# Patient Record
Sex: FEMALE | Race: WHITE | HISPANIC OR LATINO
[De-identification: names, ages, dates, MRNs, and addresses within clinical notes are randomized per-mention and may not be internally consistent; named-entity substitution may affect disease eponyms.]

---

## 2017-02-23 ENCOUNTER — APPOINTMENT (OUTPATIENT)
Dept: INTERNAL MEDICINE | Facility: CLINIC | Age: 42
End: 2017-02-23

## 2017-02-23 VITALS
OXYGEN SATURATION: 98 % | HEART RATE: 83 BPM | HEIGHT: 63 IN | BODY MASS INDEX: 39.16 KG/M2 | WEIGHT: 221 LBS | DIASTOLIC BLOOD PRESSURE: 77 MMHG | SYSTOLIC BLOOD PRESSURE: 114 MMHG | TEMPERATURE: 98.8 F

## 2017-03-01 ENCOUNTER — APPOINTMENT (OUTPATIENT)
Dept: OBGYN | Facility: CLINIC | Age: 42
End: 2017-03-01

## 2017-03-01 VITALS
BODY MASS INDEX: 38.45 KG/M2 | SYSTOLIC BLOOD PRESSURE: 117 MMHG | HEIGHT: 63 IN | DIASTOLIC BLOOD PRESSURE: 79 MMHG | WEIGHT: 217 LBS | HEART RATE: 94 BPM

## 2017-03-06 ENCOUNTER — APPOINTMENT (OUTPATIENT)
Dept: OBGYN | Facility: CLINIC | Age: 42
End: 2017-03-06

## 2017-03-07 LAB
BASOPHILS # BLD AUTO: 0.03 K/UL
BASOPHILS NFR BLD AUTO: 0.3 %
CANDIDA VAG CYTO: NOT DETECTED
EOSINOPHIL # BLD AUTO: 0.19 K/UL
EOSINOPHIL NFR BLD AUTO: 2.1 %
FSH SERPL-MCNC: 4.2 IU/L
G VAGINALIS+PREV SP MTYP VAG QL MICRO: DETECTED
HCT VFR BLD CALC: 36.8 %
HGB BLD-MCNC: 11.7 G/DL
IMM GRANULOCYTES NFR BLD AUTO: 0.2 %
LYMPHOCYTES # BLD AUTO: 2.5 K/UL
LYMPHOCYTES NFR BLD AUTO: 27 %
MAN DIFF?: NORMAL
MCHC RBC-ENTMCNC: 24.3 PG
MCHC RBC-ENTMCNC: 31.8 GM/DL
MCV RBC AUTO: 76.3 FL
MONOCYTES # BLD AUTO: 1.32 K/UL
MONOCYTES NFR BLD AUTO: 14.3 %
NEUTROPHILS # BLD AUTO: 5.2 K/UL
NEUTROPHILS NFR BLD AUTO: 56.1 %
PLATELET # BLD AUTO: 405 K/UL
RBC # BLD: 4.82 M/UL
RBC # FLD: 19.9 %
T VAGINALIS VAG QL WET PREP: NOT DETECTED
T4 FREE SERPL-MCNC: 1.2 NG/DL
TSH SERPL-ACNC: 3.72 UIU/ML
WBC # FLD AUTO: 9.26 K/UL

## 2017-03-08 ENCOUNTER — OTHER (OUTPATIENT)
Age: 42
End: 2017-03-08

## 2017-03-17 ENCOUNTER — APPOINTMENT (OUTPATIENT)
Dept: OBGYN | Facility: CLINIC | Age: 42
End: 2017-03-17

## 2017-03-17 VITALS
DIASTOLIC BLOOD PRESSURE: 80 MMHG | SYSTOLIC BLOOD PRESSURE: 121 MMHG | WEIGHT: 218.6 LBS | HEIGHT: 63 IN | BODY MASS INDEX: 38.73 KG/M2 | HEART RATE: 103 BPM

## 2017-03-27 ENCOUNTER — APPOINTMENT (OUTPATIENT)
Dept: SURGERY | Facility: CLINIC | Age: 42
End: 2017-03-27

## 2017-03-27 VITALS
DIASTOLIC BLOOD PRESSURE: 78 MMHG | WEIGHT: 220 LBS | HEIGHT: 63 IN | RESPIRATION RATE: 16 BRPM | SYSTOLIC BLOOD PRESSURE: 109 MMHG | BODY MASS INDEX: 38.98 KG/M2 | HEART RATE: 102 BPM | TEMPERATURE: 98.3 F | OXYGEN SATURATION: 98 %

## 2017-03-27 DIAGNOSIS — Z87.09 PERSONAL HISTORY OF OTHER DISEASES OF THE RESPIRATORY SYSTEM: ICD-10-CM

## 2017-03-27 DIAGNOSIS — J20.8 ACUTE BRONCHITIS DUE TO OTHER SPECIFIED ORGANISMS: ICD-10-CM

## 2017-03-27 DIAGNOSIS — B96.89 ACUTE BRONCHITIS DUE TO OTHER SPECIFIED ORGANISMS: ICD-10-CM

## 2017-03-27 DIAGNOSIS — Z78.9 OTHER SPECIFIED HEALTH STATUS: ICD-10-CM

## 2017-03-27 RX ORDER — CEFUROXIME AXETIL 500 MG/1
500 TABLET ORAL
Qty: 20 | Refills: 1 | Status: DISCONTINUED | COMMUNITY
Start: 2017-02-23 | End: 2017-03-27

## 2017-03-27 RX ORDER — FLUTICASONE PROPIONATE 50 UG/1
50 SPRAY, METERED NASAL DAILY
Qty: 1 | Refills: 3 | Status: DISCONTINUED | COMMUNITY
Start: 2017-02-23 | End: 2017-03-27

## 2017-03-27 RX ORDER — METRONIDAZOLE 7.5 MG/G
0.75 GEL VAGINAL
Qty: 1 | Refills: 0 | Status: DISCONTINUED | COMMUNITY
Start: 2017-03-08 | End: 2017-03-27

## 2017-04-14 ENCOUNTER — APPOINTMENT (OUTPATIENT)
Dept: MRI IMAGING | Facility: IMAGING CENTER | Age: 42
End: 2017-04-14

## 2017-04-14 ENCOUNTER — MEDICATION RENEWAL (OUTPATIENT)
Age: 42
End: 2017-04-14

## 2017-04-14 ENCOUNTER — OUTPATIENT (OUTPATIENT)
Dept: OUTPATIENT SERVICES | Facility: HOSPITAL | Age: 42
LOS: 1 days | End: 2017-04-14
Payer: COMMERCIAL

## 2017-04-14 DIAGNOSIS — N92.6 IRREGULAR MENSTRUATION, UNSPECIFIED: ICD-10-CM

## 2017-04-14 DIAGNOSIS — Z90.49 ACQUIRED ABSENCE OF OTHER SPECIFIED PARTS OF DIGESTIVE TRACT: Chronic | ICD-10-CM

## 2017-04-14 DIAGNOSIS — N70.11 CHRONIC SALPINGITIS: ICD-10-CM

## 2017-04-14 PROCEDURE — A9585: CPT

## 2017-04-14 PROCEDURE — 72197 MRI PELVIS W/O & W/DYE: CPT

## 2017-08-08 ENCOUNTER — APPOINTMENT (OUTPATIENT)
Dept: RADIOLOGY | Facility: HOSPITAL | Age: 42
End: 2017-08-08

## 2017-08-10 ENCOUNTER — OUTPATIENT (OUTPATIENT)
Dept: OUTPATIENT SERVICES | Facility: HOSPITAL | Age: 42
LOS: 1 days | End: 2017-08-10
Payer: COMMERCIAL

## 2017-08-10 DIAGNOSIS — Z90.49 ACQUIRED ABSENCE OF OTHER SPECIFIED PARTS OF DIGESTIVE TRACT: Chronic | ICD-10-CM

## 2017-08-10 DIAGNOSIS — E66.9 OBESITY, UNSPECIFIED: ICD-10-CM

## 2017-08-10 PROCEDURE — 76700 US EXAM ABDOM COMPLETE: CPT | Mod: 26

## 2017-08-10 PROCEDURE — 76700 US EXAM ABDOM COMPLETE: CPT

## 2017-08-10 PROCEDURE — 74241: CPT

## 2017-08-10 PROCEDURE — 74241: CPT | Mod: 26

## 2017-09-26 ENCOUNTER — APPOINTMENT (OUTPATIENT)
Dept: OBGYN | Facility: CLINIC | Age: 42
End: 2017-09-26
Payer: COMMERCIAL

## 2017-09-26 VITALS
SYSTOLIC BLOOD PRESSURE: 105 MMHG | HEIGHT: 63 IN | BODY MASS INDEX: 40.08 KG/M2 | DIASTOLIC BLOOD PRESSURE: 81 MMHG | HEART RATE: 92 BPM | WEIGHT: 226.19 LBS

## 2017-09-26 PROCEDURE — 99396 PREV VISIT EST AGE 40-64: CPT

## 2017-09-26 RX ORDER — DIAZEPAM 5 MG/1
5 TABLET ORAL DAILY
Qty: 10 | Refills: 0 | Status: DISCONTINUED | COMMUNITY
Start: 2017-04-14 | End: 2017-09-26

## 2017-10-07 LAB
C TRACH RRNA SPEC QL NAA+PROBE: NORMAL
FSH SERPL-MCNC: 2.5 IU/L
N GONORRHOEA RRNA SPEC QL NAA+PROBE: NORMAL
SOURCE AMPLIFICATION: NORMAL
T4 FREE SERPL-MCNC: 0.9 NG/DL
TSH SERPL-ACNC: 2.44 UIU/ML

## 2017-10-24 ENCOUNTER — APPOINTMENT (OUTPATIENT)
Dept: INTERNAL MEDICINE | Facility: CLINIC | Age: 42
End: 2017-10-24
Payer: COMMERCIAL

## 2017-10-24 ENCOUNTER — MEDICATION RENEWAL (OUTPATIENT)
Age: 42
End: 2017-10-24

## 2017-10-24 VITALS
SYSTOLIC BLOOD PRESSURE: 110 MMHG | WEIGHT: 230 LBS | OXYGEN SATURATION: 98 % | DIASTOLIC BLOOD PRESSURE: 74 MMHG | TEMPERATURE: 98.9 F | HEIGHT: 63 IN | HEART RATE: 82 BPM | BODY MASS INDEX: 40.75 KG/M2

## 2017-10-24 LAB
BILIRUB UR QL STRIP: NEGATIVE
GLUCOSE UR-MCNC: NEGATIVE
HCG UR QL: 0.2 EU/DL
HGB UR QL STRIP.AUTO: NORMAL
KETONES UR-MCNC: NEGATIVE
LEUKOCYTE ESTERASE UR QL STRIP: NEGATIVE
NITRITE UR QL STRIP: NEGATIVE
PH UR STRIP: 6.5
PROT UR STRIP-MCNC: NEGATIVE
SP GR UR STRIP: 1.02

## 2017-10-24 PROCEDURE — 99214 OFFICE O/P EST MOD 30 MIN: CPT | Mod: 25

## 2017-10-24 PROCEDURE — 36415 COLL VENOUS BLD VENIPUNCTURE: CPT

## 2017-10-24 RX ORDER — DOXYCYCLINE 100 MG/1
100 CAPSULE ORAL TWICE DAILY
Qty: 20 | Refills: 1 | Status: DISCONTINUED | COMMUNITY
Start: 2017-10-24 | End: 2017-10-24

## 2017-10-25 LAB
ALBUMIN SERPL ELPH-MCNC: 4.4 G/DL
ALP BLD-CCNC: 105 U/L
ALT SERPL-CCNC: 15 U/L
ANION GAP SERPL CALC-SCNC: 18 MMOL/L
AST SERPL-CCNC: 12 U/L
BASOPHILS # BLD AUTO: 0.01 K/UL
BASOPHILS NFR BLD AUTO: 0.1 %
BILIRUB SERPL-MCNC: 0.2 MG/DL
BUN SERPL-MCNC: 12 MG/DL
CALCIUM SERPL-MCNC: 10 MG/DL
CHLORIDE SERPL-SCNC: 103 MMOL/L
CO2 SERPL-SCNC: 20 MMOL/L
CREAT SERPL-MCNC: 1.01 MG/DL
EOSINOPHIL # BLD AUTO: 0.2 K/UL
EOSINOPHIL NFR BLD AUTO: 2.2 %
GLUCOSE SERPL-MCNC: 78 MG/DL
HBA1C MFR BLD HPLC: 5.7 %
HCT VFR BLD CALC: 36.7 %
HGB BLD-MCNC: 11.1 G/DL
IMM GRANULOCYTES NFR BLD AUTO: 0.3 %
INR PPP: 0.86 RATIO
LYMPHOCYTES # BLD AUTO: 2.71 K/UL
LYMPHOCYTES NFR BLD AUTO: 29.4 %
MAN DIFF?: NORMAL
MCHC RBC-ENTMCNC: 23.4 PG
MCHC RBC-ENTMCNC: 30.2 GM/DL
MCV RBC AUTO: 77.3 FL
MONOCYTES # BLD AUTO: 1.13 K/UL
MONOCYTES NFR BLD AUTO: 12.3 %
NEUTROPHILS # BLD AUTO: 5.13 K/UL
NEUTROPHILS NFR BLD AUTO: 55.7 %
PLATELET # BLD AUTO: 353 K/UL
POTASSIUM SERPL-SCNC: 4.4 MMOL/L
PROT SERPL-MCNC: 7.1 G/DL
PT BLD: 9.7 SEC
RBC # BLD: 4.75 M/UL
RBC # FLD: 17.1 %
SODIUM SERPL-SCNC: 141 MMOL/L
WBC # FLD AUTO: 9.21 K/UL

## 2017-12-08 ENCOUNTER — OUTPATIENT (OUTPATIENT)
Dept: OUTPATIENT SERVICES | Facility: HOSPITAL | Age: 42
LOS: 1 days | End: 2017-12-08
Payer: COMMERCIAL

## 2017-12-08 ENCOUNTER — APPOINTMENT (OUTPATIENT)
Dept: SURGERY | Facility: CLINIC | Age: 42
End: 2017-12-08
Payer: COMMERCIAL

## 2017-12-08 VITALS
DIASTOLIC BLOOD PRESSURE: 68 MMHG | HEIGHT: 63 IN | WEIGHT: 220.02 LBS | TEMPERATURE: 98 F | SYSTOLIC BLOOD PRESSURE: 100 MMHG | HEART RATE: 81 BPM | OXYGEN SATURATION: 100 %

## 2017-12-08 DIAGNOSIS — E66.01 MORBID (SEVERE) OBESITY DUE TO EXCESS CALORIES: ICD-10-CM

## 2017-12-08 DIAGNOSIS — Z01.818 ENCOUNTER FOR OTHER PREPROCEDURAL EXAMINATION: ICD-10-CM

## 2017-12-08 DIAGNOSIS — Z98.890 OTHER SPECIFIED POSTPROCEDURAL STATES: Chronic | ICD-10-CM

## 2017-12-08 DIAGNOSIS — Z90.49 ACQUIRED ABSENCE OF OTHER SPECIFIED PARTS OF DIGESTIVE TRACT: Chronic | ICD-10-CM

## 2017-12-08 DIAGNOSIS — Z98.891 HISTORY OF UTERINE SCAR FROM PREVIOUS SURGERY: Chronic | ICD-10-CM

## 2017-12-08 LAB
ALBUMIN SERPL ELPH-MCNC: 4.2 G/DL — SIGNIFICANT CHANGE UP (ref 3.3–5)
ALP SERPL-CCNC: 87 U/L — SIGNIFICANT CHANGE UP (ref 40–120)
ALT FLD-CCNC: 21 U/L — SIGNIFICANT CHANGE UP (ref 10–45)
ANION GAP SERPL CALC-SCNC: 13 MMOL/L — SIGNIFICANT CHANGE UP (ref 5–17)
AST SERPL-CCNC: 25 U/L — SIGNIFICANT CHANGE UP (ref 10–40)
BILIRUB SERPL-MCNC: 0.3 MG/DL — SIGNIFICANT CHANGE UP (ref 0.2–1.2)
BLD GP AB SCN SERPL QL: NEGATIVE — SIGNIFICANT CHANGE UP
BUN SERPL-MCNC: 11 MG/DL — SIGNIFICANT CHANGE UP (ref 7–23)
CALCIUM SERPL-MCNC: 9.9 MG/DL — SIGNIFICANT CHANGE UP (ref 8.4–10.5)
CHLORIDE SERPL-SCNC: 101 MMOL/L — SIGNIFICANT CHANGE UP (ref 96–108)
CO2 SERPL-SCNC: 22 MMOL/L — SIGNIFICANT CHANGE UP (ref 22–31)
CREAT SERPL-MCNC: 0.87 MG/DL — SIGNIFICANT CHANGE UP (ref 0.5–1.3)
GLUCOSE SERPL-MCNC: 100 MG/DL — HIGH (ref 70–99)
HBA1C BLD-MCNC: 5.4 % — SIGNIFICANT CHANGE UP (ref 4–5.6)
HCT VFR BLD CALC: 33.6 % — LOW (ref 34.5–45)
HGB BLD-MCNC: 10.6 G/DL — LOW (ref 11.5–15.5)
MCHC RBC-ENTMCNC: 24.4 PG — LOW (ref 27–34)
MCHC RBC-ENTMCNC: 31.5 GM/DL — LOW (ref 32–36)
MCV RBC AUTO: 77.4 FL — LOW (ref 80–100)
PLATELET # BLD AUTO: 352 K/UL — SIGNIFICANT CHANGE UP (ref 150–400)
POTASSIUM SERPL-MCNC: 4.4 MMOL/L — SIGNIFICANT CHANGE UP (ref 3.5–5.3)
POTASSIUM SERPL-SCNC: 4.4 MMOL/L — SIGNIFICANT CHANGE UP (ref 3.5–5.3)
PROT SERPL-MCNC: 7.3 G/DL — SIGNIFICANT CHANGE UP (ref 6–8.3)
RBC # BLD: 4.34 M/UL — SIGNIFICANT CHANGE UP (ref 3.8–5.2)
RBC # FLD: 16.7 % — HIGH (ref 10.3–14.5)
RH IG SCN BLD-IMP: POSITIVE — SIGNIFICANT CHANGE UP
SODIUM SERPL-SCNC: 136 MMOL/L — SIGNIFICANT CHANGE UP (ref 135–145)
WBC # BLD: 4.58 K/UL — SIGNIFICANT CHANGE UP (ref 3.8–10.5)
WBC # FLD AUTO: 4.58 K/UL — SIGNIFICANT CHANGE UP (ref 3.8–10.5)

## 2017-12-08 PROCEDURE — G0463: CPT

## 2017-12-08 PROCEDURE — 80053 COMPREHEN METABOLIC PANEL: CPT

## 2017-12-08 PROCEDURE — 86900 BLOOD TYPING SEROLOGIC ABO: CPT

## 2017-12-08 PROCEDURE — 85027 COMPLETE CBC AUTOMATED: CPT

## 2017-12-08 PROCEDURE — 86901 BLOOD TYPING SEROLOGIC RH(D): CPT

## 2017-12-08 PROCEDURE — 99215 OFFICE O/P EST HI 40 MIN: CPT

## 2017-12-08 PROCEDURE — 86850 RBC ANTIBODY SCREEN: CPT

## 2017-12-08 PROCEDURE — 83036 HEMOGLOBIN GLYCOSYLATED A1C: CPT

## 2017-12-08 RX ORDER — LIDOCAINE HCL 20 MG/ML
0.2 VIAL (ML) INJECTION ONCE
Qty: 0 | Refills: 0 | Status: DISCONTINUED | OUTPATIENT
Start: 2017-12-20 | End: 2017-12-20

## 2017-12-08 RX ORDER — SODIUM CHLORIDE 9 MG/ML
3 INJECTION INTRAMUSCULAR; INTRAVENOUS; SUBCUTANEOUS EVERY 8 HOURS
Qty: 0 | Refills: 0 | Status: DISCONTINUED | OUTPATIENT
Start: 2017-12-20 | End: 2017-12-20

## 2017-12-08 RX ORDER — CEFAZOLIN SODIUM 1 G
2000 VIAL (EA) INJECTION ONCE
Qty: 0 | Refills: 0 | Status: DISCONTINUED | OUTPATIENT
Start: 2017-12-20 | End: 2017-12-21

## 2017-12-08 NOTE — H&P PST ADULT - PROBLEM SELECTOR PLAN 1
Laparoscopic Vertical Sleeve Gastrectomy   Pre- Op instructions discussed  CBC,CMP,HgA1c,Type and Screen sent  [facility] ordered the dOS Laparoscopic Vertical Sleeve Gastrectomy   Pre- Op instructions discussed  CBC,CMP,HgA1c,Type and Screen sent  [facility] ordered the DOS  Incentive spirometer instructions discussed Laparoscopic Vertical Sleeve Gastrectomy   - Pre- Op instructions discussed  - CBC,CMP,HgA1c,Type and Screen sent  - [facility] ordered the DOS  - Incentive spirometer instructions discussed  - heparin SQ ordered

## 2017-12-08 NOTE — H&P PST ADULT - RS GEN PE MLT RESP DETAILS PC
clear to auscultation bilaterally/respirations non-labored/good air movement/normal/airway patent/breath sounds equal

## 2017-12-08 NOTE — H&P PST ADULT - NSANTHOSAYNRD_GEN_A_CORE
No. ZINA screening performed.  STOP BANG Legend: 0-2 = LOW Risk; 3-4 = INTERMEDIATE Risk; 5-8 = HIGH Risk

## 2017-12-08 NOTE — H&P PST ADULT - NSCAFFEINETYPE_GEN_ALL_CORE_SD
Chief Complaint   Patient presents with   • Establish Care     consult egd      Lizz Magaña is a 61 y.o. female who presents with GERD and colon polyps.  She is here today to establish care.  Her last EGD and colonoscopy were performed by Dr. Sloan Ann up.  She did have a 5 mm polyp removed from her ascending colon, diverticulosis was also noted.  EGD was notable for a hiatal hernia and some gastric polyps which were sampled.  She reports that she aspirated and developed pneumonia post procedure.  Her heartburn is fairly well controlled with over-the-counter Nexium.  She takes this about 30 minutes prior to her evening meal.  Historically her symptoms were predominantly at night.  She eats her dinner early.  She uses Tums when necessary.  She denies any abdominal pain, nausea or dysphagia.    She has a history of chronic intermittent fecal incontinence dating back to when her children were little.  This is been an issue for over 20 years.  If her stools are loose she sometimes has trouble holding her bowels.  This has happened since she had a generous episiotomy during one of her childbirths.  She sometimes has incontinence during her course.  Normally her bowels are solid and she can hold them.  She's tried multiple regimens to bulk up her stools without sustain results.  She's interested in  surgical repair.    HPI  Past Medical History:   Diagnosis Date   • Asthma    • Diabetes mellitus    • Diverticulosis    • GERD (gastroesophageal reflux disease)      Past Surgical History:   Procedure Laterality Date   • CARPAL TUNNEL RELEASE     • COLONOSCOPY  08/15/2014    Diverticulosis, polyp, IH, EH    • COLONOSCOPY  09/03/2009   • ENDOSCOPY  08/15/2014    Small HH, non bleeding poylps   • HYSTERECTOMY         Current Outpatient Prescriptions:   •  aspirin 81 MG EC tablet, Take 81 mg by mouth Daily., Disp: , Rfl:   •  Cyanocobalamin (VITAMIN B 12 PO), Take  by mouth., Disp: , Rfl:   •  esomeprazole (nexIUM) 40 MG  capsule, Take 40 mg by mouth Every Morning Before Breakfast., Disp: , Rfl:   •  glimepiride (AMARYL) 2 MG tablet, TK 1 T PO  BID., Disp: , Rfl: 0  •  meloxicam (MOBIC) 15 MG tablet, Take 15 mg by mouth Daily., Disp: , Rfl:   •  metFORMIN ER (GLUCOPHAGE-XR) 500 MG 24 hr tablet, TK 2 TS PO BID, Disp: , Rfl: 0  •  nisoldipine (SULAR) 34 MG 24 hr tablet, Take 34 mg by mouth Daily., Disp: , Rfl:   •  vitamin D (ERGOCALCIFEROL) 64134 UNITS capsule capsule, Take 50,000 Units by mouth 1 (One) Time Per Week., Disp: , Rfl:   Allergies   Allergen Reactions   • Demerol [Meperidine]    • Eggs Or Egg-Derived Products    • Penicillins      Social History     Social History   • Marital status:      Spouse name: N/A   • Number of children: N/A   • Years of education: N/A     Occupational History   • Not on file.     Social History Main Topics   • Smoking status: Never Smoker   • Smokeless tobacco: Never Used   • Alcohol use No   • Drug use: No   • Sexual activity: Not on file     Other Topics Concern   • Not on file     Social History Narrative   • No narrative on file     Family History   Problem Relation Age of Onset   • Stomach cancer Mother      Review of Systems-no weight loss, occasional blood on the toilet tissue-otherwise negative except history of present illness  Vitals:    04/04/17 1328   BP: 122/80     Last Weight    04/04/17  1328   Weight: 209 lb 6.4 oz (95 kg)     Physical Exam   Constitutional: She is oriented to person, place, and time. She appears well-developed and well-nourished.   HENT:   Head: Normocephalic and atraumatic.   Eyes: Conjunctivae are normal. No scleral icterus.   Neck: Normal range of motion.   Pulmonary/Chest: Effort normal.   Abdominal: She exhibits no distension.   Neurological: She is alert and oriented to person, place, and time.   Skin: Skin is warm and dry.   Psychiatric: She has a normal mood and affect.   Nursing note and vitals reviewed.    No images are attached to the  encounter.  No notes on file  Lizz was seen today for establish care.    Diagnoses and all orders for this visit:    Gastroesophageal reflux disease, esophagitis presence not specified    Colon polyp    Fecal incontinence    Plan-  - She is due for repeat colonoscopy in 2019  - Her heartburn is fairly well controlled on her current regimen-continue same  - Request past from her last EGD and colonoscopy from Dr. Jun Sheets  - She is interested in surgical repair of her prior episiotomy site which is caused her to have weakness and leakage.  She seen a colorectal surgeon for this in the past but did not follow-up due to the options she was offered.  She's interested in a second opinion.  I have given her the name of Dr. Lissy Flores.   coffee

## 2017-12-08 NOTE — H&P PST ADULT - HISTORY OF PRESENT ILLNESS
44 y/o female with  PMH of Anemia , Asthma -no meds( no h/o of exacerbation past 20 years ), Morbid Obesity (BMI - 38 ) . Pt was evaluated for bariatric surgery all all work up done . Presets to PST for scheduled Laparoscopic Vertical Sleeve Gastrectomy on 12/20/2017. 44 y/o female with  PMH of Anemia , Asthma -no meds( no h/o of exacerbation past 20 years ), Morbid Obesity (BMI - 38 ) . Pt was evaluated for bariatric surgery all all work up done . Presents to PST for scheduled Laparoscopic Vertical Sleeve Gastrectomy on 12/20/2017. 44 y/o female with  PMH of Anemia , Asthma -no meds( no h/o of exacerbation past 20 years ), Morbid Obesity (BMI - 39 ) . Pt was evaluated for bariatric surgery all all work up done . Presents to PST for scheduled Laparoscopic Vertical Sleeve Gastrectomy on 12/20/2017.

## 2017-12-08 NOTE — H&P PST ADULT - PMH
Asthma  last attack was at age of 22 - no meds now Abscess of right leg  history - resolved  Anemia    Asthma  last attack was at age of 22 - no meds now  Cyst of ovary  s/p removal  Morbid (severe) obesity due to excess calories

## 2017-12-20 ENCOUNTER — INPATIENT (INPATIENT)
Facility: HOSPITAL | Age: 42
LOS: 0 days | Discharge: HOME | DRG: 621 | End: 2017-12-21
Attending: SURGERY | Admitting: SURGERY
Payer: COMMERCIAL

## 2017-12-20 ENCOUNTER — APPOINTMENT (OUTPATIENT)
Dept: SURGERY | Facility: HOSPITAL | Age: 42
End: 2017-12-20
Payer: COMMERCIAL

## 2017-12-20 ENCOUNTER — RESULT REVIEW (OUTPATIENT)
Age: 42
End: 2017-12-20

## 2017-12-20 ENCOUNTER — TRANSCRIPTION ENCOUNTER (OUTPATIENT)
Age: 42
End: 2017-12-20

## 2017-12-20 VITALS
OXYGEN SATURATION: 97 % | HEIGHT: 63 IN | DIASTOLIC BLOOD PRESSURE: 73 MMHG | HEART RATE: 96 BPM | RESPIRATION RATE: 18 BRPM | WEIGHT: 217.82 LBS | TEMPERATURE: 99 F | SYSTOLIC BLOOD PRESSURE: 104 MMHG

## 2017-12-20 DIAGNOSIS — Z98.890 OTHER SPECIFIED POSTPROCEDURAL STATES: Chronic | ICD-10-CM

## 2017-12-20 DIAGNOSIS — Z98.891 HISTORY OF UTERINE SCAR FROM PREVIOUS SURGERY: Chronic | ICD-10-CM

## 2017-12-20 DIAGNOSIS — E66.01 MORBID (SEVERE) OBESITY DUE TO EXCESS CALORIES: ICD-10-CM

## 2017-12-20 DIAGNOSIS — Z90.49 ACQUIRED ABSENCE OF OTHER SPECIFIED PARTS OF DIGESTIVE TRACT: Chronic | ICD-10-CM

## 2017-12-20 LAB
ANION GAP SERPL CALC-SCNC: 14 MMOL/L — SIGNIFICANT CHANGE UP (ref 5–17)
BASOPHILS # BLD AUTO: 0 K/UL — SIGNIFICANT CHANGE UP (ref 0–0.2)
BASOPHILS NFR BLD AUTO: 0 % — SIGNIFICANT CHANGE UP (ref 0–2)
BUN SERPL-MCNC: 10 MG/DL — SIGNIFICANT CHANGE UP (ref 7–23)
CALCIUM SERPL-MCNC: 9 MG/DL — SIGNIFICANT CHANGE UP (ref 8.4–10.5)
CHLORIDE SERPL-SCNC: 104 MMOL/L — SIGNIFICANT CHANGE UP (ref 96–108)
CO2 SERPL-SCNC: 22 MMOL/L — SIGNIFICANT CHANGE UP (ref 22–31)
CREAT SERPL-MCNC: 0.78 MG/DL — SIGNIFICANT CHANGE UP (ref 0.5–1.3)
EOSINOPHIL # BLD AUTO: 0 K/UL — SIGNIFICANT CHANGE UP (ref 0–0.5)
EOSINOPHIL NFR BLD AUTO: 0.3 % — SIGNIFICANT CHANGE UP (ref 0–6)
GLUCOSE SERPL-MCNC: 157 MG/DL — HIGH (ref 70–99)
HCG UR QL: NEGATIVE — SIGNIFICANT CHANGE UP
HCT VFR BLD CALC: 33.3 % — LOW (ref 34.5–45)
HCT VFR BLD CALC: 33.7 % — LOW (ref 34.5–45)
HGB BLD-MCNC: 10.9 G/DL — LOW (ref 11.5–15.5)
HGB BLD-MCNC: 11.2 G/DL — LOW (ref 11.5–15.5)
LYMPHOCYTES # BLD AUTO: 1.1 K/UL — SIGNIFICANT CHANGE UP (ref 1–3.3)
LYMPHOCYTES # BLD AUTO: 12.4 % — LOW (ref 13–44)
MAGNESIUM SERPL-MCNC: 2 MG/DL — SIGNIFICANT CHANGE UP (ref 1.6–2.6)
MCHC RBC-ENTMCNC: 25.7 PG — LOW (ref 27–34)
MCHC RBC-ENTMCNC: 25.9 PG — LOW (ref 27–34)
MCHC RBC-ENTMCNC: 32.8 GM/DL — SIGNIFICANT CHANGE UP (ref 32–36)
MCHC RBC-ENTMCNC: 33.1 GM/DL — SIGNIFICANT CHANGE UP (ref 32–36)
MCV RBC AUTO: 78.1 FL — LOW (ref 80–100)
MCV RBC AUTO: 78.3 FL — LOW (ref 80–100)
MONOCYTES # BLD AUTO: 0.3 K/UL — SIGNIFICANT CHANGE UP (ref 0–0.9)
MONOCYTES NFR BLD AUTO: 3.1 % — SIGNIFICANT CHANGE UP (ref 2–14)
NEUTROPHILS # BLD AUTO: 7.5 K/UL — HIGH (ref 1.8–7.4)
NEUTROPHILS NFR BLD AUTO: 84.2 % — HIGH (ref 43–77)
PHOSPHATE SERPL-MCNC: 2 MG/DL — LOW (ref 2.5–4.5)
PLATELET # BLD AUTO: 258 K/UL — SIGNIFICANT CHANGE UP (ref 150–400)
PLATELET # BLD AUTO: 267 K/UL — SIGNIFICANT CHANGE UP (ref 150–400)
POTASSIUM SERPL-MCNC: 3.7 MMOL/L — SIGNIFICANT CHANGE UP (ref 3.5–5.3)
POTASSIUM SERPL-SCNC: 3.7 MMOL/L — SIGNIFICANT CHANGE UP (ref 3.5–5.3)
RBC # BLD: 4.26 M/UL — SIGNIFICANT CHANGE UP (ref 3.8–5.2)
RBC # BLD: 4.31 M/UL — SIGNIFICANT CHANGE UP (ref 3.8–5.2)
RBC # FLD: 14.8 % — HIGH (ref 10.3–14.5)
RBC # FLD: 14.9 % — HIGH (ref 10.3–14.5)
RH IG SCN BLD-IMP: POSITIVE — SIGNIFICANT CHANGE UP
SODIUM SERPL-SCNC: 140 MMOL/L — SIGNIFICANT CHANGE UP (ref 135–145)
WBC # BLD: 7.1 K/UL — SIGNIFICANT CHANGE UP (ref 3.8–10.5)
WBC # BLD: 8.9 K/UL — SIGNIFICANT CHANGE UP (ref 3.8–10.5)
WBC # FLD AUTO: 7.1 K/UL — SIGNIFICANT CHANGE UP (ref 3.8–10.5)
WBC # FLD AUTO: 8.9 K/UL — SIGNIFICANT CHANGE UP (ref 3.8–10.5)

## 2017-12-20 PROCEDURE — 43775 LAP SLEEVE GASTRECTOMY: CPT

## 2017-12-20 RX ORDER — ACETAMINOPHEN 500 MG
1000 TABLET ORAL ONCE
Qty: 0 | Refills: 0 | Status: COMPLETED | OUTPATIENT
Start: 2017-12-20 | End: 2017-12-20

## 2017-12-20 RX ORDER — OMEPRAZOLE 10 MG/1
1 CAPSULE, DELAYED RELEASE ORAL
Qty: 30 | Refills: 0
Start: 2017-12-20 | End: 2018-01-18

## 2017-12-20 RX ORDER — HYOSCYAMINE SULFATE 0.13 MG
1 TABLET ORAL
Qty: 28 | Refills: 0
Start: 2017-12-20 | End: 2017-12-26

## 2017-12-20 RX ORDER — ONDANSETRON 8 MG/1
4 TABLET, FILM COATED ORAL EVERY 6 HOURS
Qty: 0 | Refills: 0 | Status: DISCONTINUED | OUTPATIENT
Start: 2017-12-20 | End: 2017-12-21

## 2017-12-20 RX ORDER — SODIUM CHLORIDE 9 MG/ML
1000 INJECTION, SOLUTION INTRAVENOUS
Qty: 0 | Refills: 0 | Status: COMPLETED | OUTPATIENT
Start: 2017-12-20 | End: 2017-12-20

## 2017-12-20 RX ORDER — HYDROMORPHONE HYDROCHLORIDE 2 MG/ML
0.5 INJECTION INTRAMUSCULAR; INTRAVENOUS; SUBCUTANEOUS
Qty: 0 | Refills: 0 | Status: DISCONTINUED | OUTPATIENT
Start: 2017-12-20 | End: 2017-12-21

## 2017-12-20 RX ORDER — HEPARIN SODIUM 5000 [USP'U]/ML
5000 INJECTION INTRAVENOUS; SUBCUTANEOUS ONCE
Qty: 0 | Refills: 0 | Status: COMPLETED | OUTPATIENT
Start: 2017-12-20 | End: 2017-12-20

## 2017-12-20 RX ORDER — PANTOPRAZOLE SODIUM 20 MG/1
40 TABLET, DELAYED RELEASE ORAL DAILY
Qty: 0 | Refills: 0 | Status: DISCONTINUED | OUTPATIENT
Start: 2017-12-20 | End: 2017-12-21

## 2017-12-20 RX ORDER — ONDANSETRON 8 MG/1
1 TABLET, FILM COATED ORAL
Qty: 21 | Refills: 0
Start: 2017-12-20 | End: 2017-12-26

## 2017-12-20 RX ORDER — FAMOTIDINE 10 MG/ML
20 INJECTION INTRAVENOUS ONCE
Qty: 0 | Refills: 0 | Status: COMPLETED | OUTPATIENT
Start: 2017-12-20 | End: 2017-12-20

## 2017-12-20 RX ORDER — SODIUM CHLORIDE 9 MG/ML
1000 INJECTION, SOLUTION INTRAVENOUS
Qty: 0 | Refills: 0 | Status: DISCONTINUED | OUTPATIENT
Start: 2017-12-20 | End: 2017-12-21

## 2017-12-20 RX ORDER — HYDROMORPHONE HYDROCHLORIDE 2 MG/ML
0.5 INJECTION INTRAMUSCULAR; INTRAVENOUS; SUBCUTANEOUS ONCE
Qty: 0 | Refills: 0 | Status: DISCONTINUED | OUTPATIENT
Start: 2017-12-20 | End: 2017-12-20

## 2017-12-20 RX ORDER — PROCHLORPERAZINE MALEATE 5 MG
5 TABLET ORAL ONCE
Qty: 0 | Refills: 0 | Status: DISCONTINUED | OUTPATIENT
Start: 2017-12-20 | End: 2017-12-21

## 2017-12-20 RX ORDER — HEPARIN SODIUM 5000 [USP'U]/ML
5000 INJECTION INTRAVENOUS; SUBCUTANEOUS EVERY 8 HOURS
Qty: 0 | Refills: 0 | Status: DISCONTINUED | OUTPATIENT
Start: 2017-12-20 | End: 2017-12-21

## 2017-12-20 RX ORDER — OXYCODONE HYDROCHLORIDE 5 MG/1
5 TABLET ORAL
Qty: 120 | Refills: 0
Start: 2017-12-20 | End: 2017-12-23

## 2017-12-20 RX ORDER — CEFAZOLIN SODIUM 1 G
2000 VIAL (EA) INJECTION EVERY 8 HOURS
Qty: 0 | Refills: 0 | Status: COMPLETED | OUTPATIENT
Start: 2017-12-20 | End: 2017-12-20

## 2017-12-20 RX ORDER — KETOROLAC TROMETHAMINE 30 MG/ML
30 SYRINGE (ML) INJECTION EVERY 6 HOURS
Qty: 0 | Refills: 0 | Status: DISCONTINUED | OUTPATIENT
Start: 2017-12-20 | End: 2017-12-21

## 2017-12-20 RX ORDER — HYOSCYAMINE SULFATE 0.13 MG
0.12 TABLET ORAL EVERY 6 HOURS
Qty: 0 | Refills: 0 | Status: DISCONTINUED | OUTPATIENT
Start: 2017-12-20 | End: 2017-12-21

## 2017-12-20 RX ORDER — POTASSIUM CHLORIDE 20 MEQ
10 PACKET (EA) ORAL
Qty: 0 | Refills: 0 | Status: COMPLETED | OUTPATIENT
Start: 2017-12-20 | End: 2017-12-20

## 2017-12-20 RX ORDER — POTASSIUM PHOSPHATE, MONOBASIC POTASSIUM PHOSPHATE, DIBASIC 236; 224 MG/ML; MG/ML
15 INJECTION, SOLUTION INTRAVENOUS ONCE
Qty: 0 | Refills: 0 | Status: COMPLETED | OUTPATIENT
Start: 2017-12-20 | End: 2017-12-20

## 2017-12-20 RX ORDER — ACETAMINOPHEN 500 MG
1000 TABLET ORAL ONCE
Qty: 0 | Refills: 0 | Status: COMPLETED | OUTPATIENT
Start: 2017-12-21 | End: 2017-12-21

## 2017-12-20 RX ADMIN — Medication 30 MILLIGRAM(S): at 18:30

## 2017-12-20 RX ADMIN — Medication 400 MILLIGRAM(S): at 14:24

## 2017-12-20 RX ADMIN — Medication 30 MILLIGRAM(S): at 17:40

## 2017-12-20 RX ADMIN — ONDANSETRON 4 MILLIGRAM(S): 8 TABLET, FILM COATED ORAL at 17:40

## 2017-12-20 RX ADMIN — Medication 1000 MILLIGRAM(S): at 22:14

## 2017-12-20 RX ADMIN — HYDROMORPHONE HYDROCHLORIDE 0.5 MILLIGRAM(S): 2 INJECTION INTRAMUSCULAR; INTRAVENOUS; SUBCUTANEOUS at 10:00

## 2017-12-20 RX ADMIN — SODIUM CHLORIDE 250 MILLILITER(S): 9 INJECTION, SOLUTION INTRAVENOUS at 23:04

## 2017-12-20 RX ADMIN — HYDROMORPHONE HYDROCHLORIDE 0.5 MILLIGRAM(S): 2 INJECTION INTRAMUSCULAR; INTRAVENOUS; SUBCUTANEOUS at 10:30

## 2017-12-20 RX ADMIN — HEPARIN SODIUM 5000 UNIT(S): 5000 INJECTION INTRAVENOUS; SUBCUTANEOUS at 21:59

## 2017-12-20 RX ADMIN — HYDROMORPHONE HYDROCHLORIDE 0.5 MILLIGRAM(S): 2 INJECTION INTRAMUSCULAR; INTRAVENOUS; SUBCUTANEOUS at 12:27

## 2017-12-20 RX ADMIN — Medication 30 MILLIGRAM(S): at 12:26

## 2017-12-20 RX ADMIN — Medication 1000 MILLIGRAM(S): at 14:36

## 2017-12-20 RX ADMIN — HYDROMORPHONE HYDROCHLORIDE 0.5 MILLIGRAM(S): 2 INJECTION INTRAMUSCULAR; INTRAVENOUS; SUBCUTANEOUS at 20:48

## 2017-12-20 RX ADMIN — ONDANSETRON 4 MILLIGRAM(S): 8 TABLET, FILM COATED ORAL at 12:21

## 2017-12-20 RX ADMIN — PANTOPRAZOLE SODIUM 40 MILLIGRAM(S): 20 TABLET, DELAYED RELEASE ORAL at 10:00

## 2017-12-20 RX ADMIN — Medication 100 MILLIGRAM(S): at 23:04

## 2017-12-20 RX ADMIN — SODIUM CHLORIDE 3 MILLILITER(S): 9 INJECTION INTRAMUSCULAR; INTRAVENOUS; SUBCUTANEOUS at 06:14

## 2017-12-20 RX ADMIN — POTASSIUM PHOSPHATE, MONOBASIC POTASSIUM PHOSPHATE, DIBASIC 62.5 MILLIMOLE(S): 236; 224 INJECTION, SOLUTION INTRAVENOUS at 16:03

## 2017-12-20 RX ADMIN — Medication 0.12 MILLIGRAM(S): at 12:30

## 2017-12-20 RX ADMIN — Medication 100 MILLIGRAM(S): at 14:53

## 2017-12-20 RX ADMIN — Medication 0.12 MILLIGRAM(S): at 17:38

## 2017-12-20 RX ADMIN — Medication 30 MILLIGRAM(S): at 12:20

## 2017-12-20 RX ADMIN — HEPARIN SODIUM 5000 UNIT(S): 5000 INJECTION INTRAVENOUS; SUBCUTANEOUS at 06:14

## 2017-12-20 RX ADMIN — Medication 400 MILLIGRAM(S): at 21:59

## 2017-12-20 RX ADMIN — HEPARIN SODIUM 5000 UNIT(S): 5000 INJECTION INTRAVENOUS; SUBCUTANEOUS at 14:23

## 2017-12-20 RX ADMIN — HYDROMORPHONE HYDROCHLORIDE 0.5 MILLIGRAM(S): 2 INJECTION INTRAMUSCULAR; INTRAVENOUS; SUBCUTANEOUS at 20:33

## 2017-12-20 RX ADMIN — HYDROMORPHONE HYDROCHLORIDE 0.5 MILLIGRAM(S): 2 INJECTION INTRAMUSCULAR; INTRAVENOUS; SUBCUTANEOUS at 10:15

## 2017-12-20 RX ADMIN — FAMOTIDINE 20 MILLIGRAM(S): 10 INJECTION INTRAVENOUS at 10:27

## 2017-12-20 NOTE — DISCHARGE NOTE ADULT - PLAN OF CARE
Recovery from surgery, weight loss WOUND CARE: Steri-strips have been applied to your incisions.  Do not remove these.  They will fall off as they get wet; in approximately 7-10 days.   BATHING: Please do not submerge wound underwater. You may shower and/or sponge bathe.  ACTIVITY: No heavy lifting or straining. Otherwise, you may return to your usual level of physical activity. If you are taking narcotic pain medication (such as Percocet), do NOT drive a car, operate machinery or make important decisions.  DIET: Continue bariatric clear liquid today and begin full liquid diet as instructed tomorrow for a period of 2 weeks then advance to soft/puree   NOTIFY YOUR SURGEON IF: You have any bleeding that does not stop, any pus draining from your wound, any fever (over 100.4 F) or chills, persistent nausea/vomiting, persistent diarrhea, or if your pain is not controlled on your discharge pain medications.  FOLLOW-UP:  1. Follow-up with Dr. Vásquez within 1-2 weeks of discharge.  Please call office for appointment  2. Please follow up with your primary care physician in one week regarding your hospitalization. F/U with MD orders

## 2017-12-20 NOTE — PATIENT PROFILE ADULT. - PMH
Abscess of right leg  history - resolved  Anemia    Asthma  last attack was at age of 22 - no meds now  Cyst of ovary  s/p removal  Morbid (severe) obesity due to excess calories

## 2017-12-20 NOTE — PHARMACY COMMUNICATION NOTE - COMMENTS
Patient medication reconciliation done. Patient currently not taking any medications. Patient was informed to take daily multivitamins post surgically. Patient reeducated on NSAID avoidance (ibuprofen, ASA, naproxen, aleve) as they increased risk of GI bleeding. Patient informed to use APAP for mild pain otherwise contact prescriber for consult. Patient was informed on indications and directions for administration for hyoscyamine SL, oxycodone liquid, ondansetron ODT, and omeprazole

## 2017-12-20 NOTE — DISCHARGE NOTE ADULT - CARE PROVIDER_API CALL
Siddhartha Vásquez), Surgery  [address] [facility]  [address] [place] [id number]  Phone: [phone]  Fax: [phone]

## 2017-12-20 NOTE — DISCHARGE NOTE ADULT - CARE PLAN
Principal Discharge DX:	Morbid (severe) obesity due to excess calories  Goal:	Recovery from surgery, weight loss  Instructions for follow-up, activity and diet:	WOUND CARE: Steri-strips have been applied to your incisions.  Do not remove these.  They will fall off as they get wet; in approximately 7-10 days.   BATHING: Please do not submerge wound underwater. You may shower and/or sponge bathe.  ACTIVITY: No heavy lifting or straining. Otherwise, you may return to your usual level of physical activity. If you are taking narcotic pain medication (such as Percocet), do NOT drive a car, operate machinery or make important decisions.  DIET: Continue bariatric clear liquid today and begin full liquid diet as instructed tomorrow for a period of 2 weeks then advance to soft/puree   NOTIFY YOUR SURGEON IF: You have any bleeding that does not stop, any pus draining from your wound, any fever (over 100.4 F) or chills, persistent nausea/vomiting, persistent diarrhea, or if your pain is not controlled on your discharge pain medications.  FOLLOW-UP:  1. Follow-up with Dr. Vásquez within 1-2 weeks of discharge.  Please call office for appointment  2. Please follow up with your primary care physician in one week regarding your hospitalization.  Secondary Diagnosis:	Asthma  Secondary Diagnosis:	Anemia Principal Discharge DX:	Morbid (severe) obesity due to excess calories  Goal:	Recovery from surgery, weight loss  Instructions for follow-up, activity and diet:	WOUND CARE: Steri-strips have been applied to your incisions.  Do not remove these.  They will fall off as they get wet; in approximately 7-10 days.   BATHING: Please do not submerge wound underwater. You may shower and/or sponge bathe.  ACTIVITY: No heavy lifting or straining. Otherwise, you may return to your usual level of physical activity. If you are taking narcotic pain medication (such as Percocet), do NOT drive a car, operate machinery or make important decisions.  DIET: Continue bariatric clear liquid today and begin full liquid diet as instructed tomorrow for a period of 2 weeks then advance to soft/puree   NOTIFY YOUR SURGEON IF: You have any bleeding that does not stop, any pus draining from your wound, any fever (over 100.4 F) or chills, persistent nausea/vomiting, persistent diarrhea, or if your pain is not controlled on your discharge pain medications.  FOLLOW-UP:  1. Follow-up with Dr. Vásquez within 1-2 weeks of discharge.  Please call office for appointment  2. Please follow up with your primary care physician in one week regarding your hospitalization.  Secondary Diagnosis:	Asthma  Secondary Diagnosis:	Anemia  Instructions for follow-up, activity and diet:	F/U with MD orders  Instructions for follow-up, activity and diet:	F/U with MD orders

## 2017-12-20 NOTE — DISCHARGE NOTE ADULT - HOSPITAL COURSE
42 y/o female with  PMH of Anemia , Asthma -no meds( no h/o of exacerbation past 20 years ), Morbid Obesity (BMI - 39 ) . Pt was evaluated for bariatric surgery all all work up done . Presents to [facility] for scheduled Laparoscopic Vertical Sleeve Gastrectomy on 12/20/2017.  On 12/20 pt underwent laparoscopic sleeve gastrectomy. She tolerated the procedure well, was extubated and sent to PACU  in  stable condition.  She remained hemodynamically stable and was transferred to a bariatric unit with bedside continuous pulse oximetry.  Her pain was controlled by IV pain medications and then she was transitioned to liquid oral pain medications.  Hannah catheter was placed intraoperatively, catheter was subsequently removed and she passed trial of void.  POD #1 she was started on a bariatric clear liquid diet, which she tolerated.  She is ambulating independently, voiding, tolerating a diet, and has effective pain control.  She is stable for discharge home and will follow-up with Dr. Vásquez in 1-2 weeks.  She was also advised to follow-up with her primary medical doctor in 1-2 weeks as well as her nutritionist. 44 y/o female with  PMH of Anemia , Asthma -no meds( no h/o of exacerbation past 20 years ), Morbid Obesity (BMI - 39 ) . Pt was evaluated for bariatric surgery all all work up done . Presents to [facility] for scheduled Laparoscopic Vertical Sleeve Gastrectomy on 12/20/2017.  On 12/20 pt underwent laparoscopic sleeve gastrectomy. She tolerated the procedure well, was extubated and sent to PACU  in  stable condition.  She remained hemodynamically stable and was transferred to a bariatric unit with bedside continuous pulse oximetry.  Her pain was controlled by IV pain medications and then she was transitioned to liquid oral pain medications.  She was tachycardic perioperatively, EKG showed sinus tachycardia.  Hannah catheter was placed intraoperatively, catheter was subsequently removed and she passed trial of void.  POD #1 she was started on a bariatric clear liquid diet, which she tolerated.  She is ambulating independently, voiding, tolerating a diet, and has effective pain control.  She is stable for discharge home and will follow-up with Dr. Vásquez in 1-2 weeks.  She was also advised to follow-up with her primary medical doctor in 1-2 weeks as well as her nutritionist. 44 y/o female with  PMH of Anemia , Asthma -no meds( no h/o of exacerbation past 20 years ), Morbid Obesity (BMI - 39 ) . Pt was evaluated for bariatric surgery all all work up done . Presents to [facility] for scheduled Laparoscopic Vertical Sleeve Gastrectomy on 12/20/2017.  On 12/20 pt underwent laparoscopic sleeve gastrectomy. She tolerated the procedure well, was extubated and sent to PACU  in  stable condition.  She remained hemodynamically stable and was transferred to a bariatric unit with bedside continuous pulse oximetry.  Her pain was controlled by IV pain medications and then she was transitioned to liquid oral pain medications.  She was tachycardic perioperatively, EKG showed sinus tachycardia.  Hannah catheter was placed intraoperatively, catheter was subsequently removed and she passed trial of void.      POD #1 she was started on a bariatric clear liquid diet, which she tolerated.  She is ambulating independently, voiding, tolerating a diet, and has effective pain control.  Pt seen by Dr August Cardiologist for tachycardia at 111bpm. who determined that pt had no acute coronary event. Bilateral duplex obtained and negative for DVT. . She is stable for discharge and sent home with VTE prophylaxis due to family history of PE/DVT  (mom and dad) and will follow-up with Dr. Vásquez in 1-2 weeks. She was advised to stop Lovenox immediately for active signs of bleeding  and report same to Dr Vásquez's office.   She was also advised to follow-up with her primary medical doctor in 1-2 weeks as well as her nutritionist. S

## 2017-12-20 NOTE — DISCHARGE NOTE ADULT - MEDICATION SUMMARY - MEDICATIONS TO TAKE
I will START or STAY ON the medications listed below when I get home from the hospital:    oxyCODONE 5 mg/5 mL oral solution  -- 5 milliliter(s) by mouth every 4 hours MDD:30 as needed  -- Caution federal law prohibits the transfer of this drug to any person other  than the person for whom it was prescribed.  May cause drowsiness.  Alcohol may intensify this effect.  Use care when operating dangerous machinery.  This prescription cannot be refilled.  Using more of this medication than prescribed may cause serious breathing problems.    -- Indication: For pain    Zofran ODT 4 mg oral tablet, disintegrating  -- 1 tab(s) by mouth 3 times a day MDD:3 as needed  -- Indication: For nausea    hyoscyamine 0.125 mg sublingual tablet  -- 1 tab(s) under tongue every 6 hours MDD:4 as needed  -- May cause drowsiness.  Alcohol may intensify this effect.  Use care when operating dangerous machinery.    -- Indication: For gastric spasm    omeprazole 40 mg oral delayed release capsule  -- 1 cap(s) by mouth once a day MDD:1 open and mix in applesauce  -- do not swallow whole,mix content in applesauce  -- Indication: For reflux I will START or STAY ON the medications listed below when I get home from the hospital:    oxyCODONE 5 mg/5 mL oral solution  -- 5 milliliter(s) by mouth every 4 hours MDD:30 as needed  -- Caution federal law prohibits the transfer of this drug to any person other  than the person for whom it was prescribed.  May cause drowsiness.  Alcohol may intensify this effect.  Use care when operating dangerous machinery.  This prescription cannot be refilled.  Using more of this medication than prescribed may cause serious breathing problems.    -- Indication: For pain    enoxaparin 40 mg/0.4 mL injectable solution  -- 40 milligram(s) subcutaneously once a day MDD:1  -- It is very important that you take or use this exactly as directed.  Do not skip doses or discontinue unless directed by your doctor.    -- Indication: For blood thinner to prevent blood clot    Zofran ODT 4 mg oral tablet, disintegrating  -- 1 tab(s) by mouth 3 times a day MDD:3 as needed  -- Indication: For nausea    hyoscyamine 0.125 mg sublingual tablet  -- 1 tab(s) under tongue every 6 hours MDD:4 as needed  -- May cause drowsiness.  Alcohol may intensify this effect.  Use care when operating dangerous machinery.    -- Indication: For gastric spasm    omeprazole 40 mg oral delayed release capsule  -- 1 cap(s) by mouth once a day MDD:1 open and mix in applesauce  -- do not swallow whole,mix content in applesauce  -- Indication: For reflux

## 2017-12-20 NOTE — DISCHARGE NOTE ADULT - INSTRUCTIONS
Continue bariatric clear liquid today and begin full liquid diet as instructed tomorrow for a period of 2 weeks then advance to soft/puree F/U with MD orders

## 2017-12-20 NOTE — PROVIDER CONTACT NOTE (OTHER) - RECOMMENDATIONS
Patient asked to have ice chips. Patient encouraged ambulation and use of i/s. Will continue to monitor closely.

## 2017-12-20 NOTE — DISCHARGE NOTE ADULT - PATIENT PORTAL LINK FT
“You can access the [employer] Patient Portal, offered by [facility], by registering with the following website: [email or web address]”

## 2017-12-20 NOTE — PROVIDER CONTACT NOTE (OTHER) - ASSESSMENT
/76, Hr 111, T 98.6, RR 18 98% room air. Pt denies chest pain, SOB. Complaints of abdominal pain 7/10. Denies nausea at this time. Patient monitored on CPOX.

## 2017-12-20 NOTE — BRIEF OPERATIVE NOTE - PROCEDURE
<<-----Click on this checkbox to enter Procedure Gastrectomy, laparoscopic sleeve  12/20/2017    Active  ALEXO Detail Level: Zone

## 2017-12-20 NOTE — CHART NOTE - NSCHARTNOTEFT_GEN_A_CORE
GENERAL SURGERY POST-OP NOTE:     Status post:  lap vertical sleeve gastrectomy    Subjective:  c/o pain somewhat relieved with medications  has not gotten out of bed yet  c/o nausea, reports vomiting (per nurse, phlegm)      Objective:    MEDICATIONS  (STANDING):  acetaminophen  IVPB. 1000 milliGRAM(s) IV Intermittent once  acetaminophen  IVPB. 1000 milliGRAM(s) IV Intermittent once  ceFAZolin   IVPB 2000 milliGRAM(s) IV Intermittent every 8 hours  ceFAZolin   IVPB 2000 milliGRAM(s) IV Intermittent once  heparin  Injectable 5000 Unit(s) SubCutaneous every 8 hours  hyoscyamine SL 0.125 milliGRAM(s) SubLingual every 6 hours  ketorolac   Injectable 30 milliGRAM(s) IV Push every 6 hours  lactated ringers. 1000 milliLiter(s) (150 mL/Hr) IV Continuous <Continuous>  multivitamin/thiamine/folic acid in sodium chloride 0.9% 1000 milliLiter(s) (250 mL/Hr) IV Continuous <Continuous>  ondansetron Injectable 4 milliGRAM(s) IV Push every 6 hours  pantoprazole  Injectable 40 milliGRAM(s) IV Push daily  potassium phosphate IVPB 15 milliMole(s) IV Intermittent once    MEDICATIONS  (PRN):  aluminum hydroxide/magnesium hydroxide/simethicone Suspension 30 milliLiter(s) Oral every 4 hours PRN Dyspepsia  HYDROmorphone  Injectable 0.5 milliGRAM(s) IV Push every 10 minutes PRN Severe Pain (7 - 10)  prochlorperazine   IVPB 5 milliGRAM(s) IV Intermittent once PRN nausea      Vital Signs Last 24 Hrs  T(C): 36.8 (20 Dec 2017 11:00), Max: 37 (20 Dec 2017 06:24)  T(F): 98.2 (20 Dec 2017 11:00), Max: 98.6 (20 Dec 2017 06:24)  HR: 108 (20 Dec 2017 11:45) (96 - 115)  BP: 109/67 (20 Dec 2017 11:45) (104/73 - 136/65)  BP(mean): 83 (20 Dec 2017 11:45) (77 - 93)  RR: 14 (20 Dec 2017 10:00) (14 - 18)  SpO2: 97% (20 Dec 2017 11:45) (96% - 100%)    I&O's Detail    20 Dec 2017 07:01  -  20 Dec 2017 12:48  --------------------------------------------------------  IN:    lactated ringers.: 450 mL  Total IN: 450 mL    OUT:    Indwelling Catheter - Urethral: 60 mL  Total OUT: 60 mL    Total NET: 390 mL          Daily Height in cm: 160.02 (20 Dec 2017 06:24)    Daily     LABS:                        10.9   8.9   )-----------( 267      ( 20 Dec 2017 10:11 )             33.3     12-20    140  |  104  |  10  ----------------------------<  157<H>  3.7   |  22  |  0.78    Ca    9.0      20 Dec 2017 10:11  Phos  2.0     12-20  Mg     2.0     12-20            PHYSICAL EXAM:  NAD  Abd obese, appropriately tender  incisions C/D/I with steri strips    A/P  42F s/p lap vertical sleeve gastrectomy  -holm removed 11AM due to leaking, DTV  -NPO/IVF, possible clears later if nausea resolves  -pain control, monitor HR  -DVT PPX  -IS  -OOB/amb  -floor transfer    NITIN Chowdhury PA-C , pager # [phone]

## 2017-12-21 VITALS
RESPIRATION RATE: 18 BRPM | TEMPERATURE: 98 F | OXYGEN SATURATION: 99 % | DIASTOLIC BLOOD PRESSURE: 75 MMHG | HEART RATE: 101 BPM | SYSTOLIC BLOOD PRESSURE: 118 MMHG

## 2017-12-21 LAB
ANION GAP SERPL CALC-SCNC: 11 MMOL/L — SIGNIFICANT CHANGE UP (ref 5–17)
BASOPHILS # BLD AUTO: 0 K/UL — SIGNIFICANT CHANGE UP (ref 0–0.2)
BASOPHILS NFR BLD AUTO: 0.1 % — SIGNIFICANT CHANGE UP (ref 0–2)
BUN SERPL-MCNC: 9 MG/DL — SIGNIFICANT CHANGE UP (ref 7–23)
CALCIUM SERPL-MCNC: 8.9 MG/DL — SIGNIFICANT CHANGE UP (ref 8.4–10.5)
CHLORIDE SERPL-SCNC: 108 MMOL/L — SIGNIFICANT CHANGE UP (ref 96–108)
CO2 SERPL-SCNC: 21 MMOL/L — LOW (ref 22–31)
CREAT SERPL-MCNC: 0.72 MG/DL — SIGNIFICANT CHANGE UP (ref 0.5–1.3)
EOSINOPHIL # BLD AUTO: 0 K/UL — SIGNIFICANT CHANGE UP (ref 0–0.5)
EOSINOPHIL NFR BLD AUTO: 0 % — SIGNIFICANT CHANGE UP (ref 0–6)
GLUCOSE SERPL-MCNC: 93 MG/DL — SIGNIFICANT CHANGE UP (ref 70–99)
HCT VFR BLD CALC: 28.6 % — LOW (ref 34.5–45)
HCT VFR BLD CALC: 28.8 % — LOW (ref 34.5–45)
HGB BLD-MCNC: 9.1 G/DL — LOW (ref 11.5–15.5)
HGB BLD-MCNC: 9.5 G/DL — LOW (ref 11.5–15.5)
LYMPHOCYTES # BLD AUTO: 0.9 K/UL — LOW (ref 1–3.3)
LYMPHOCYTES # BLD AUTO: 10.4 % — LOW (ref 13–44)
MAGNESIUM SERPL-MCNC: 2 MG/DL — SIGNIFICANT CHANGE UP (ref 1.6–2.6)
MCHC RBC-ENTMCNC: 23.9 PG — LOW (ref 27–34)
MCHC RBC-ENTMCNC: 25.9 PG — LOW (ref 27–34)
MCHC RBC-ENTMCNC: 31.8 GM/DL — LOW (ref 32–36)
MCHC RBC-ENTMCNC: 32.9 GM/DL — SIGNIFICANT CHANGE UP (ref 32–36)
MCV RBC AUTO: 75.3 FL — LOW (ref 80–100)
MCV RBC AUTO: 78.8 FL — LOW (ref 80–100)
MONOCYTES # BLD AUTO: 1.2 K/UL — HIGH (ref 0–0.9)
MONOCYTES NFR BLD AUTO: 14 % — SIGNIFICANT CHANGE UP (ref 2–14)
NEUTROPHILS # BLD AUTO: 6.5 K/UL — SIGNIFICANT CHANGE UP (ref 1.8–7.4)
NEUTROPHILS NFR BLD AUTO: 75.5 % — SIGNIFICANT CHANGE UP (ref 43–77)
PHOSPHATE SERPL-MCNC: 2.7 MG/DL — SIGNIFICANT CHANGE UP (ref 2.5–4.5)
PLATELET # BLD AUTO: 239 K/UL — SIGNIFICANT CHANGE UP (ref 150–400)
PLATELET # BLD AUTO: 258 K/UL — SIGNIFICANT CHANGE UP (ref 150–400)
POTASSIUM SERPL-MCNC: 3.8 MMOL/L — SIGNIFICANT CHANGE UP (ref 3.5–5.3)
POTASSIUM SERPL-SCNC: 3.8 MMOL/L — SIGNIFICANT CHANGE UP (ref 3.5–5.3)
RBC # BLD: 3.65 M/UL — LOW (ref 3.8–5.2)
RBC # BLD: 3.8 M/UL — SIGNIFICANT CHANGE UP (ref 3.8–5.2)
RBC # FLD: 14.8 % — HIGH (ref 10.3–14.5)
RBC # FLD: 16.2 % — HIGH (ref 10.3–14.5)
SODIUM SERPL-SCNC: 140 MMOL/L — SIGNIFICANT CHANGE UP (ref 135–145)
WBC # BLD: 8.6 K/UL — SIGNIFICANT CHANGE UP (ref 3.8–10.5)
WBC # BLD: 9.27 K/UL — SIGNIFICANT CHANGE UP (ref 3.8–10.5)
WBC # FLD AUTO: 8.6 K/UL — SIGNIFICANT CHANGE UP (ref 3.8–10.5)
WBC # FLD AUTO: 9.27 K/UL — SIGNIFICANT CHANGE UP (ref 3.8–10.5)

## 2017-12-21 PROCEDURE — 86901 BLOOD TYPING SEROLOGIC RH(D): CPT

## 2017-12-21 PROCEDURE — 93970 EXTREMITY STUDY: CPT

## 2017-12-21 PROCEDURE — 93010 ELECTROCARDIOGRAM REPORT: CPT

## 2017-12-21 PROCEDURE — 81025 URINE PREGNANCY TEST: CPT

## 2017-12-21 PROCEDURE — 85027 COMPLETE CBC AUTOMATED: CPT

## 2017-12-21 PROCEDURE — 80048 BASIC METABOLIC PNL TOTAL CA: CPT

## 2017-12-21 PROCEDURE — 86900 BLOOD TYPING SEROLOGIC ABO: CPT

## 2017-12-21 PROCEDURE — 93005 ELECTROCARDIOGRAM TRACING: CPT

## 2017-12-21 PROCEDURE — 83735 ASSAY OF MAGNESIUM: CPT

## 2017-12-21 PROCEDURE — 84100 ASSAY OF PHOSPHORUS: CPT

## 2017-12-21 PROCEDURE — 93970 EXTREMITY STUDY: CPT | Mod: 26

## 2017-12-21 RX ORDER — ENOXAPARIN SODIUM 100 MG/ML
40 INJECTION SUBCUTANEOUS
Qty: 40 | Refills: 0
Start: 2017-12-21 | End: 2017-12-27

## 2017-12-21 RX ADMIN — Medication 0.12 MILLIGRAM(S): at 16:14

## 2017-12-21 RX ADMIN — ONDANSETRON 4 MILLIGRAM(S): 8 TABLET, FILM COATED ORAL at 06:03

## 2017-12-21 RX ADMIN — PANTOPRAZOLE SODIUM 40 MILLIGRAM(S): 20 TABLET, DELAYED RELEASE ORAL at 16:01

## 2017-12-21 RX ADMIN — HEPARIN SODIUM 5000 UNIT(S): 5000 INJECTION INTRAVENOUS; SUBCUTANEOUS at 06:00

## 2017-12-21 RX ADMIN — Medication 1000 MILLIGRAM(S): at 03:25

## 2017-12-21 RX ADMIN — ONDANSETRON 4 MILLIGRAM(S): 8 TABLET, FILM COATED ORAL at 00:17

## 2017-12-21 RX ADMIN — Medication 0.12 MILLIGRAM(S): at 00:17

## 2017-12-21 RX ADMIN — Medication 30 MILLIGRAM(S): at 06:21

## 2017-12-21 RX ADMIN — HEPARIN SODIUM 5000 UNIT(S): 5000 INJECTION INTRAVENOUS; SUBCUTANEOUS at 16:09

## 2017-12-21 RX ADMIN — Medication 30 MILLIGRAM(S): at 00:18

## 2017-12-21 RX ADMIN — Medication 400 MILLIGRAM(S): at 03:10

## 2017-12-21 RX ADMIN — ONDANSETRON 4 MILLIGRAM(S): 8 TABLET, FILM COATED ORAL at 18:38

## 2017-12-21 RX ADMIN — Medication 30 MILLIGRAM(S): at 06:06

## 2017-12-21 RX ADMIN — Medication 0.12 MILLIGRAM(S): at 06:00

## 2017-12-21 RX ADMIN — ONDANSETRON 4 MILLIGRAM(S): 8 TABLET, FILM COATED ORAL at 16:01

## 2017-12-21 RX ADMIN — Medication 0.12 MILLIGRAM(S): at 18:38

## 2017-12-21 NOTE — PROGRESS NOTE ADULT - SUBJECTIVE AND OBJECTIVE BOX
BARIATRIC SURGERY PROGRESS NOTE    INTERVAL HPI/OVERNIGHT EVENTS: None  ====================================  MEDICATIONS  (STANDING):  ceFAZolin   IVPB 2000 milliGRAM(s) IV Intermittent once  heparin  Injectable 5000 Unit(s) SubCutaneous every 8 hours  hyoscyamine SL 0.125 milliGRAM(s) SubLingual every 6 hours  ketorolac   Injectable 30 milliGRAM(s) IV Push every 6 hours  lactated ringers. 1000 milliLiter(s) (150 mL/Hr) IV Continuous <Continuous>  ondansetron Injectable 4 milliGRAM(s) IV Push every 6 hours  pantoprazole  Injectable 40 milliGRAM(s) IV Push daily    MEDICATIONS  (PRN):  aluminum hydroxide/magnesium hydroxide/simethicone Suspension 30 milliLiter(s) Oral every 4 hours PRN Dyspepsia  HYDROmorphone  Injectable 0.5 milliGRAM(s) IV Push every 10 minutes PRN Severe Pain (7 - 10)  prochlorperazine   IVPB 5 milliGRAM(s) IV Intermittent once PRN nausea    ====================================  Allergies    No Known Allergies    Intolerances      ====================================  REVIEW OF SYSTEMS:  Negative for cardiac, pulmonary, dermatologic, neurologic, endocrine, gastrointestinal, genitourinary, and psychiatric, except where listed above.   ====================================  Vital Signs Last 24 Hrs  T(C): 36.8 (21 Dec 2017 05:04), Max: 37.3 (20 Dec 2017 20:18)  T(F): 98.2 (21 Dec 2017 05:04), Max: 99.2 (20 Dec 2017 20:18)  HR: 102 (21 Dec 2017 05:04) (90 - 115)  BP: 108/69 (21 Dec 2017 05:04) (98/66 - 136/65)  BP(mean): 81 (20 Dec 2017 16:00) (76 - 93)  RR: 18 (21 Dec 2017 05:04) (14 - 18)  SpO2: 97% (21 Dec 2017 05:04) (94% - 100%)  ====================================  I&O's Detail    20 Dec 2017 07:01  -  21 Dec 2017 05:50  --------------------------------------------------------  IN:    IV PiggyBack: 150 mL    lactated ringers.: 900 mL    multivitamin/thiamine/folic acid in sodium chloride 0.9%: 1000 mL  Total IN: 2050 mL    OUT:    Indwelling Catheter - Urethral: 60 mL    Voided: 1550 mL  Total OUT: 1610 mL    Total NET: 440 mL        ====================================  PHYSICAL EXAM:  Gen: alert, comfortable, no distress  Cor: regular rate and rhythm  Pulm: bilateral equal chest rise  Abd: soft, nondistended nontender  Ext: no edema noted  Incision: Steri-strips in place, minimal staining.  ====================================  LABS:                        11.2   7.1   )-----------( 258      ( 20 Dec 2017 18:02 )             33.7     12-20    140  |  104  |  10  ----------------------------<  157<H>  3.7   |  22  |  0.78    Ca    9.0      20 Dec 2017 10:11  Phos  2.0     12-20  Mg     2.0     12-20          ====================================  RADIOLOGY & ADDITIONAL TESTS:

## 2017-12-21 NOTE — PROGRESS NOTE ADULT - SUBJECTIVE AND OBJECTIVE BOX
Post Op Day#: 1    Subjective: Feels good, No chest pain, no shortness of breath. Minimal abdominal pain.    Objective: Pt with documented HR of 114bpm intraop prior to incision. EKG obtained in PACU  and Sinus Tachycardia at 111bpm. No ST changes . Seen by Dr August - Cardiologist and significant pathology identified. Pt denies Pt SOB, chest pain. afebrile, v/s stable. Tolerating bariatric liquid, no N/V. Voiding. Ambulating around the unit w/o complaints.                                                Vital Signs Last 24 Hrs  T(C): 36.8 (21 Dec 2017 13:49), Max: 37.3 (20 Dec 2017 20:18)  T(F): 98.3 (21 Dec 2017 13:49), Max: 99.2 (20 Dec 2017 20:18)  HR: 103 (21 Dec 2017 13:49) (90 - 111)  BP: 103/65 (21 Dec 2017 13:49) (98/66 - 116/62)  BP(mean): 81 (20 Dec 2017 16:00) (81 - 88)  RR: 18 (21 Dec 2017 08:00) (16 - 18)  SpO2: 97% (21 Dec 2017 08:00) (95% - 99%)                                               I&O's Summary    20 Dec 2017 07:01  -  21 Dec 2017 07:00  --------------------------------------------------------  IN: 3700 mL / OUT: 1610 mL / NET: 2090 mL    21 Dec 2017 07:01  -  21 Dec 2017 14:39  --------------------------------------------------------  IN: 900 mL / OUT: 0 mL / NET: 900 mL                                                                      9.1    9.27  )-----------( 258      ( 21 Dec 2017 09:08 )             28.6                                                 12-21    140  |  108  |  9   ----------------------------<  93  3.8   |  21<L>  |  0.72    Ca    8.9      21 Dec 2017 07:17  Phos  2.7     12-21  Mg     2.0     12-21        Physical Exam:         Lungs:  clear breath sounds b/l       Heart:  Regular rate & rhythm       Abdomen:  Soft, non-distended.  Scopes sites clean, dry and intact. + bs, - flatus, no rebound or guarding       Skin:  No rash       Extremities: + pulses, no edema, no calf swelling, no pain or tenderness. , negative faustino's                Caprini VTE Risk Score:  3.4( moderate ) No extended prophylaxis required post-discharge  [facility] VTE RISK SCORE:  8 (low) No extended prophylaxis required post-discharge    Assessment and Plan: s/p Lap Sleeve Gastrectomy POD#1    - Bariatric Clear diet today and then protocol derived staged meal progression   - GI prophylaxis, Incentive spirometry  - Ambulation at least every 3 hours and as indicated  -  Procedure specific education including diet, vitamins and VTE prevention. Written materials given  - Medication reviewed and reconciled, Bariatric meds obtained from [facility]  -  D/C home once Bariatric 8-Point d/c criteria met  -  Follow up with Dr Vásquez in 7-10 days, Dietitian and PMD in 30 days.      Clary Osuna, JOSH, ANP  [phone] Post Op Day#: 1    Subjective: Feels good, No chest pain, no shortness of breath. Minimal abdominal pain.    Objective: Pt with documented HR of 114bpm intraop prior to incision. EKG obtained in PACU  and Sinus Tachycardia at 111bpm. No ST changes . Seen by Dr August - Cardiologist who determined that there was no acute coronary events and cleared pt from cardiac standpoint for d/c.  Pt denies Pt SOB, chest pain. afebrile, v/s stable. Tolerating bariatric liquid, no N/V. Voiding. Ambulating around the unit w/o complaints.  Will obtain duplex prior to d/c this evening. if negative pt will be d/c home as planned                                              Vital Signs Last 24 Hrs  T(C): 36.8 (21 Dec 2017 13:49), Max: 37.3 (20 Dec 2017 20:18)  T(F): 98.3 (21 Dec 2017 13:49), Max: 99.2 (20 Dec 2017 20:18)  HR: 103 (21 Dec 2017 13:49) (90 - 111)  BP: 103/65 (21 Dec 2017 13:49) (98/66 - 116/62)  BP(mean): 81 (20 Dec 2017 16:00) (81 - 88)  RR: 18 (21 Dec 2017 08:00) (16 - 18)  SpO2: 97% (21 Dec 2017 08:00) (95% - 99%)                                               I&O's Summary    20 Dec 2017 07:01  -  21 Dec 2017 07:00  --------------------------------------------------------  IN: 3700 mL / OUT: 1610 mL / NET: 2090 mL    21 Dec 2017 07:01  -  21 Dec 2017 14:39  --------------------------------------------------------  IN: 900 mL / OUT: 0 mL / NET: 900 mL                                                                      9.1    9.27  )-----------( 258      ( 21 Dec 2017 09:08 )             28.6                                                 12-21    140  |  108  |  9   ----------------------------<  93  3.8   |  21<L>  |  0.72    Ca    8.9      21 Dec 2017 07:17  Phos  2.7     12-21  Mg     2.0     12-21        Physical Exam:         Lungs:  clear breath sounds b/l       Heart:  Regular rate & rhythm       Abdomen:  Soft, non-distended.  Scopes sites clean, dry and intact. + bs, - flatus, no rebound or guarding       Skin:  No rash       Extremities: + pulses, no edema, no calf swelling, no pain or tenderness. , negative faustino's                Caprini VTE Risk Score:  = 5( high ) No extended prophylaxis required post-discharge  [facility] VTE RISK SCORE:  8 (low) No extended prophylaxis required post-discharge  Assessment and Plan: s/p Lap Sleeve Gastrectomy POD#1    - Bariatric Clear diet today and then protocol derived staged meal progression   - GI prophylaxis, Incentive spirometry  - Ambulation at least every 3 hours and as indicated  -  Procedure specific education including diet, vitamins and VTE prevention. Written materials given   - Post d/c extended VTE prophylaxis - LMWH 40 qd x 30 days (as per pt her dad has PE in the  past and her mom reported that her siblings also have strong hx of PE and stroke.)   - Medication reviewed and reconciled, Bariatric meds obtained from [facility]  -  D/c home one Bariatric 8-Point d/c criteria met and negative duplex.  -  Follow up with Dr Vásquez in 7-10 days, Dietitian and PMD in 30 days.  - To f.u with Dr August for cardiac related complaints.      Clary Osuna, JOSH, ANP  [phone]

## 2017-12-21 NOTE — CONSULT NOTE ADULT - SUBJECTIVE AND OBJECTIVE BOX
CHIEF COMPLAINT: heart racing overnight    HISTORY OF PRESENT ILLNESS: 43 y/o female obesity without any other comorbid conditions, post-op day #1 s/p gastric sleeve bariatric surgery, developed tachycardia (mild) overnight with feeling that her heart was racing. EKG performed. Pt feels better now and heart rate is under 100 at present.    I was asked to evaluate.    PAST MEDICAL & SURGICAL HISTORY:  Morbid (severe) obesity due to excess calories  Abscess of right leg: history - resolved  Cyst of ovary: s/p removal  Anemia  Asthma: last attack was at age of 22 - no meds now  H/O ovarian cystectomy  S/P  section: x 2-  ,   History of appendectomy:           MEDICATIONS:  heparin  Injectable 5000 Unit(s) SubCutaneous every 8 hours    ceFAZolin   IVPB 2000 milliGRAM(s) IV Intermittent once      HYDROmorphone  Injectable 0.5 milliGRAM(s) IV Push every 10 minutes PRN  ondansetron Injectable 4 milliGRAM(s) IV Push every 6 hours  prochlorperazine   IVPB 5 milliGRAM(s) IV Intermittent once PRN    aluminum hydroxide/magnesium hydroxide/simethicone Suspension 30 milliLiter(s) Oral every 4 hours PRN  hyoscyamine SL 0.125 milliGRAM(s) SubLingual every 6 hours  pantoprazole  Injectable 40 milliGRAM(s) IV Push daily      lactated ringers. 1000 milliLiter(s) IV Continuous <Continuous>      FAMILY HISTORY: N/C      SOCIAL HISTORY:    [ x ] Non-smoker  [ ] Smoker  [ ] Alcohol    Allergies    No Known Allergies    Intolerances    	    REVIEW OF SYSTEMS:  CONSTITUTIONAL: No fever, weight loss, or fatigue  EYES: No eye pain, visual disturbances, or discharge  ENMT:  No difficulty hearing, tinnitus, vertigo; No sinus or throat pain  NECK: No pain or stiffness  RESPIRATORY: No cough, wheezing, chills or hemoptysis; No Shortness of Breath  CARDIOVASCULAR: No chest pain, + palpitations, no passing out, dizziness, or leg swelling  GASTROINTESTINAL: No abdominal or epigastric pain. No nausea, vomiting, or hematemesis; No diarrhea or constipation. No melena or hematochezia.  GENITOURINARY: No dysuria, frequency, hematuria, or incontinence  NEUROLOGICAL: No headaches, memory loss, loss of strength, numbness, or tremors  SKIN: No itching, burning, rashes, or lesions   LYMPH Nodes: No enlarged glands  ENDOCRINE: No heat or cold intolerance; No hair loss  MUSCULOSKELETAL: No joint pain or swelling; No muscle, back, or extremity pain  PSYCHIATRIC: No depression, anxiety, mood swings, or difficulty sleeping  HEME/LYMPH: No easy bruising, or bleeding gums  ALLERGY AND IMMUNOLOGIC: No hives or eczema	    [ ] All others negative	  [ ] Unable to obtain    PHYSICAL EXAM:  T(F): 99.1 (17 @ 08:00), Max: 99.2 (17 @ 20:18)  HR: 93 (17 @ 08:00) (90 - 111)  BP: 116/62 (17 @ 08:00) (98/66 - 116/62)  RR: 18 (17 @ 08:00) (14 - 18)  SpO2: 97% (17 @ 08:00) (94% - 99%)  I&O's Summary    20 Dec 2017 07:  -  21 Dec 2017 07:00  --------------------------------------------------------  IN: 3700 mL / OUT: 1610 mL / NET: 2090 mL    21 Dec 2017 07:  -  21 Dec 2017 12:24  --------------------------------------------------------  IN: 440 mL / OUT: 0 mL / NET: 440 mL        Appearance: Normal	obese female in NAD  HEENT:   Normal oral mucosa, PERRL, EOMI	  Lymphatic: No lymphadenopathy  Cardiovascular: Normal S1 S2, No JVD, No murmurs, No edema  Respiratory: Lungs clear to auscultation	  Psychiatry: A & O x 3, Mood & affect appropriate  Gastrointestinal:  Soft, Non-tender, + BS	  Skin: No rashes, No ecchymoses, No cyanosis	  Neurologic: Non-focal  Extremities: Normal range of motion, No clubbing, cyanosis or edema  Vascular: Peripheral pulses palpable 2+ bilaterally        ECG:  	Sinus tach, 1st degree AV block  RADIOLOGY:    LABS:	 	                        9.1    9.27  )-----------( 258      ( 21 Dec 2017 09:08 )             28.6     12-    140  |  108  |  9   ----------------------------<  93  3.8   |  21<L>  |  0.72    Ca    8.9      21 Dec 2017 07:17  Phos  2.7       Mg     2.0

## 2017-12-21 NOTE — DIETITIAN INITIAL EVALUATION ADULT. - OTHER INFO
Consult received for bariatric surgery. Per chart pt has tried multiple wt loss attempts. Per outpatient RD note on 9/16/17 pt has tried following a diet regimen by controlling her quantity and quality of meals mostly by watching portion sizes but was unable to lose a significant amount of wt and keep it off. Pt reports weighing 230 pounds before going on the 2 week full liquid diet and her presurgical wt was 217.8 pounds. Pt now S/P laparoscopic vertical sleeve gastrectomy. Pt denies nausea/vomiting, sipping clear liquid bariatric diet at time of visit. Pt with knowledge of bariatric full liquid diet however receptive to in depth review/reinforcement. Pt states she ordered Bariatric Pal protein shakes. Pt states she also purchased some of the necessary vitamins. Pt was advised to purchase chewable/liquid multivitamin with added elemental iron, chewable/liquid calcium citrate with vitamin D and sublingual B12. Pt was advised to take the chewable/liquid multivitamin with added elemental iron at least 2 hours apart from the chewable/liquid calcium citrate with vitamin D for optimal absorption. Pt also noted with PMH of anemia and discussed with pt following up with MD regarding recommendations for iron supplementation. Pt states she plans to follow up with outpatient RD. Pt able to teach back all points discussed during interview.

## 2017-12-21 NOTE — PROGRESS NOTE ADULT - ASSESSMENT
A/P  42F s/p lap vertical sleeve gastrectomy. Doing well.    - Monitor GI/ fxn  - Diet: Sips, will plan to advance to [facility]  - Pain control  - DVT PPX  - OOB as tolerated/ IS  - Dispo: Floor

## 2017-12-21 NOTE — PROGRESS NOTE ADULT - ATTENDING COMMENTS
I saw and examined the patient, and reviewed  the history and data with the patient and staff  Agree with note which was also reviewed and edited where appropriate.  D/W patient, RN, residents and Fellow    consider PM d/c if meets d/c criteria.

## 2017-12-21 NOTE — DIETITIAN INITIAL EVALUATION ADULT. - SIGNS/SYMPTOMS
Visit Information Date & Time Provider Department Dept. Phone Encounter #  
 5/31/2017  3:30 PM Misty Wade MD [facility] and [address] [place] [address] [phone] [id number] Follow-up Instructions Return in about 2 weeks (around 6/14/2017) for nurse visit- Blood pressure. Follow-up and Disposition History Upcoming Health Maintenance Date Due  
 HPV AGE 9Y-34Y (2 of 3 - Female 3 Dose Series) 2/10/2016 INFLUENZA AGE 9 TO ADULT 8/1/2017 PAP AKA CERVICAL CYTOLOGY 9/3/2018 DTaP/Tdap/Td series (2 - Td) 5/31/2027 Allergies as of 5/31/2017  Review Complete On: 5/31/2017 By: Steven Haines Severity Noted Reaction Type Reactions Sulfa (Sulfonamide Antibiotics)  05/28/2015    Hives Current Immunizations  Never Reviewed No immunizations on file. Not reviewed this visit You Were Diagnosed With   
  
 Codes Comments Menorrhagia with regular cycle    -  Primary ICD-10-CM: N92.0 ICD-9-CM: 626.2 Elevated blood pressure reading     ICD-10-CM: R03.0 ICD-9-CM: 796.2 Encounter for other general counseling or advice on contraception     ICD-10-CM: Z30.09 
ICD-9-CM: V25.09 Vitals BP Pulse Temp Resp Height(growth percentile) Weight(growth percentile) (!) 150/93 (BP 1 Location: Left arm, BP Patient Position: Sitting) (!) 55 98 °F (36.7 °C) (Oral) 18 5' 6\" (1.676 m) 135 lb 1.6 oz (61.3 kg) LMP SpO2 BMI OB Status Smoking Status 05/22/2017 99% 21.81 kg/m2 Having regular periods Never Smoker Vitals History BMI and BSA Data Body Mass Index Body Surface Area  
 21.81 kg/m 2 1.69 m 2 Preferred Pharmacy Pharmacy Name Phone [id number]. [phone] Your Updated Medication List  
  
   
This list is accurate as of: 5/31/17  4:56 PM.  Always use your most recent med list.  
  
  
  
  
 * FLUoxetine 20 mg tablet Commonly known as:  PROzac Take 1 Tab by mouth daily. * FLUoxetine 10 mg tablet Commonly known as:  PROzac Take 2 Tabs by mouth daily. Start with 20 mg at night for 1-2 weeks, if no change, take 30 mg in daytime also. norgestimate-ethinyl estradiol 0.25-35 mg-mcg Tab Commonly known as:  [facility] [facility] (28) Take 1 Tab by mouth daily. * Notice: This list has 2 medication(s) that are the same as other medications prescribed for you. Read the directions carefully, and ask your doctor or other care provider to review them with you. Prescriptions Sent to Pharmacy Refills  
 norgestimate-ethinyl estradiol (SPRINTEC, 28,) 0.25-35 mg-mcg tab 4 Sig: Take 1 Tab by mouth daily. Class: Normal  
 Pharmacy: [address] [place] [address].  #: [phone] Route: Oral  
  
We Performed the Following REFERRAL TO OBSTETRICS AND GYNECOLOGY [REF51 Custom] Comments:  
 Please evaluate patient for IUD nulliparous Follow-up Instructions Return in about 2 weeks (around 6/14/2017) for nurse visit- Blood pressure. Referral Information Referral ID Referred By Referred To  
  
 [id number] Xu DEL TORO CNM Visits Status Start Date End Date 1 New Request 5/31/17 5/31/18 If your referral has a status of pending review or denied, additional information will be sent to support the outcome of this decision. Patient Instructions Learning About Birth Control: Intrauterine Device (IUD) What is an intrauterine device (IUD)? The intrauterine device (IUD) is used to prevent pregnancy. It's a small, plastic, T-shaped device. Your doctor places the IUD in your uterus. You have a choice between a hormonal IUD and a copper IUD. The hormonal IUD prevents pregnancy by damaging or killing sperm. It also releases a type of the hormone progestin.  Progestin prevents pregnancy in these ways: It thickens the mucus in the cervix. This makes it hard for sperm to travel into the uterus. It also thins the lining of the uterus, which makes it harder for a fertilized egg to attach to the uterus. Progestin can sometimes stop the ovaries from releasing an egg each month (ovulation). Hormonal IUDs prevent pregnancy for 3 to 5 years, depending on which IUD is used. Once you have it, you don't have to do anything else to prevent pregnancy. The copper IUD is wrapped in copper wire. Copper IUDs prevent pregnancy by making the uterus and fallopian tubes produce a fluid that kills sperm. The copper IUD prevents pregnancy for 10 years. Once you have it, you don't have to do anything else to prevent pregnancy. A string tied to the end of the IUD hangs down through the opening of the uterus (called the cervix) into the vagina. You can check that the IUD is in place by feeling for the string. The IUD usually stays in the uterus until your doctor removes it. How well does it work? In the first year of use: · When the hormonal IUD is used exactly as directed, fewer than 1 woman out of 100 has an unplanned pregnancy. · When the copper IUD is used exactly as directed, fewer than 1 woman out of 100 has an unplanned pregnancy. Be sure to tell your doctor about any health problems you have or medicines you take. He or she can help you choose the birth control method that is right for you. What are the advantages of an IUD? · An IUD is one of the most effective methods of birth control. · It prevents pregnancy for 3 to 10 years, depending on the type. You don't have to worry about birth control during this time. · It's safe to use while breastfeeding. · IUDs don't contain estrogen. So you can use an IUD if you don't want to take estrogen or can't take estrogen because you have certain health problems or concerns. · An IUD is convenient. It is always providing birth control.  You don't need to remember to take a pill or get a shot. You don't have to interrupt sex to protect against pregnancy. · A hormonal IUD may reduce heavy bleeding and cramping. What are the disadvantages of an IUD? · An IUD doesn't protect against sexually transmitted infections (STIs), such as herpes or HIV/AIDS. If you aren't sure if your sex partner might have an STI, use a condom to protect against disease. · A copper IUD may cause periods with more bleeding and cramping. · You have to see a doctor to have an IUD inserted and removed. · You have to check to see if the string is in place. Where can you learn more? Go to http://korin-dina.info/. Enter J816 in the search box to learn more about \"Learning About Birth Control: Intrauterine Device (IUD). \" Current as of: May 30, 2016 Content Version: 11.2 © 1641-2530 [employer]. Care instructions adapted under license by [employer] (which disclaims liability or warranty for this information). If you have questions about a medical condition or this instruction, always ask your healthcare professional. [place] [address] any warranty or liability for your use of this information. Elevated Blood Pressure: Care Instructions Your Care Instructions Blood pressure is a measure of how hard the blood pushes against the walls of your arteries. It's normal for blood pressure to go up and down throughout the day. But if it stays up over time, you have high blood pressure. Two numbers tell you your blood pressure. The first number is the systolic pressure. It shows how hard the blood pushes when your heart is pumping. The second number is the diastolic pressure. It shows how hard the blood pushes between heartbeats, when your heart is relaxed and filling with blood. An ideal blood pressure in adults is less than 120/80 (say \"120 over 80\"). High blood pressure is 140/90 or higher.  You have high blood pressure if your top number is 140 or higher or your bottom number is 90 or higher, or both. The main test for high blood pressure is simple, fast, and painless. To diagnose high blood pressure, your doctor will test your blood pressure at different times. After testing your blood pressure, your doctor may ask you to test it again when you are home. If you are diagnosed with high blood pressure, you can work with your doctor to make a long-term plan to manage it. Follow-up care is a key part of your treatment and safety. Be sure to make and go to all appointments, and call your doctor if you are having problems. It's also a good idea to know your test results and keep a list of the medicines you take. How can you care for yourself at home? · Do not smoke. Smoking increases your risk for heart attack and stroke. If you need help quitting, talk to your doctor about stop-smoking programs and medicines. These can increase your chances of quitting for good. · Stay at a healthy weight. · Try to limit how much sodium you eat to less than 2,300 milligrams (mg) a day. Your doctor may ask you to try to eat less than 1,500 mg a day. · Be physically active. Get at least 30 minutes of exercise on most days of the week. Walking is a good choice. You also may want to do other activities, such as running, swimming, cycling, or playing tennis or team sports. · Avoid or limit alcohol. Talk to your doctor about whether you can drink any alcohol. · Eat plenty of fruits, vegetables, and low-fat dairy products. Eat less saturated and total fats. · Learn how to check your blood pressure at home. When should you call for help? Call your doctor now or seek immediate medical care if: 
· Your blood pressure is much higher than normal (such as 180/110 or higher). · You think high blood pressure is causing symptoms such as: ¨ Severe headache. ¨ Blurry vision.  
Watch closely for changes in your health, and be sure to contact your doctor if: 
· You do not get better as expected. Where can you learn more? Go to http://korin-dina.info/. Enter M825 in the search box to learn more about \"Elevated Blood Pressure: Care Instructions. \" Current as of: October 19, 2016 Content Version: 11.2 © 4541-9280 "[employer]. Care instructions adapted under license by [employer] (which disclaims liability or warranty for this information). If you have questions about a medical condition or this instruction, always ask your healthcare professional. [address] any warranty or liability for your use of this information. Patient Instructions History Introducing [facility]! [place] introduces [employer] patient portal. Now you can access parts of your medical record, email your doctor's office, and request medication refills online. 1. In your internet browser, go to [email or web address] 2. Click on the First Time User? Click Here link in the Sign In box. You will see the New Member Sign Up page. 3. Enter your [employer] Access Code exactly as it appears below. You will not need to use this code after youve completed the sign-up process. If you do not sign up before the expiration date, you must request a new code. · [employer] Access Code: [email or web address] Expires: 8/29/2017  4:07 PM 
 
4. Enter the last four digits of your Social Security Number (xxxx) and Date of Birth (mm/dd/yyyy) as indicated and click Submit. You will be taken to the next sign-up page. 5. Create a Plasmonixt ID. This will be your [employer] login ID and cannot be changed, so think of one that is secure and easy to remember. 6. Create a [employer] password. You can change your password at any time. 7. Enter your Password Reset Question and Answer. This can be used at a later time if you forget your password. 8. Enter your e-mail address.  You will receive e-mail notification when new information is available in [employer]. 9. Click Sign Up. You can now view and download portions of your medical record. 10. Click the Download Summary menu link to download a portable copy of your medical information. If you have questions, please visit the Frequently Asked Questions section of the [employer] website. Remember, [employer] is NOT to be used for urgent needs. For medical emergencies, dial 911. Now available from your iPhone and Android! Please provide this summary of care documentation to your next provider. Your primary care clinician is listed as Cheyenne Fagan. If you have any questions after today's visit, please call [phone]. BMI: 38.6kg/m2 several failed attempts at weight loss in the past, S/P sleeve gastrectomy

## 2017-12-21 NOTE — DIETITIAN INITIAL EVALUATION ADULT. - ADHERENCE
Pt reports following a full liquid diet for 2 weeks PTA as instructed by outpatient RD. Pt reports having protein shakes, pureed soups, broth, sugar free jello and pudding and drinkable yogurt.

## 2017-12-21 NOTE — CONSULT NOTE ADULT - ASSESSMENT
41 y/o female obese s/p bariatric gastric sleeve surgery POD #1 with tachycardia and evidence of new 1st degree AV block on EKG with IL 230msec.    Repeat EKG.  Will review once complete.  Will give recommendations for discharge by phone upon review of EKG.    Otherwise, she appears hemodynamically stable and is completely asymptomatic at present.

## 2017-12-21 NOTE — DIETITIAN INITIAL EVALUATION ADULT. - ENERGY NEEDS
Ht: 63“, Wt: 217.8 lbs- presurgical, BMI: 38.6 kg/m2, IBW: 115 lbs (+/-10%)  no edema or pressure injuries

## 2017-12-27 LAB — SURGICAL PATHOLOGY STUDY: SIGNIFICANT CHANGE UP

## 2017-12-27 RX ORDER — DOXYCYCLINE HYCLATE 100 MG/1
100 CAPSULE ORAL
Qty: 20 | Refills: 1 | Status: DISCONTINUED | COMMUNITY
Start: 2017-10-24 | End: 2017-12-27

## 2017-12-27 RX ORDER — METHYLPREDNISOLONE 4 MG/1
4 TABLET ORAL
Qty: 21 | Refills: 0 | Status: DISCONTINUED | COMMUNITY
Start: 2017-06-19

## 2017-12-29 ENCOUNTER — APPOINTMENT (OUTPATIENT)
Dept: SURGERY | Facility: CLINIC | Age: 42
End: 2017-12-29
Payer: COMMERCIAL

## 2017-12-29 VITALS
SYSTOLIC BLOOD PRESSURE: 103 MMHG | DIASTOLIC BLOOD PRESSURE: 77 MMHG | HEART RATE: 96 BPM | WEIGHT: 203 LBS | HEIGHT: 63 IN | TEMPERATURE: 97.6 F | RESPIRATION RATE: 16 BRPM | OXYGEN SATURATION: 99 % | BODY MASS INDEX: 35.97 KG/M2

## 2017-12-29 PROCEDURE — 99024 POSTOP FOLLOW-UP VISIT: CPT

## 2017-12-29 RX ORDER — CYCLOBENZAPRINE HYDROCHLORIDE 10 MG/1
10 TABLET, FILM COATED ORAL
Qty: 30 | Refills: 0 | Status: DISCONTINUED | COMMUNITY
Start: 2017-11-20

## 2017-12-29 RX ORDER — IBUPROFEN 800 MG/1
800 TABLET, FILM COATED ORAL
Qty: 28 | Refills: 0 | Status: DISCONTINUED | COMMUNITY
Start: 2017-08-28

## 2017-12-29 RX ORDER — IBUPROFEN 600 MG/1
600 TABLET, FILM COATED ORAL
Qty: 20 | Refills: 0 | Status: DISCONTINUED | COMMUNITY
Start: 2017-08-07

## 2017-12-29 RX ORDER — ADAPALENE AND BENZOYL PEROXIDE 3; 25 MG/G; MG/G
0.3-2.5 GEL TOPICAL
Qty: 45 | Refills: 0 | Status: DISCONTINUED | COMMUNITY
Start: 2017-08-07 | End: 2017-12-29

## 2017-12-29 RX ORDER — DOXYCYCLINE HYCLATE 100 MG/1
100 TABLET ORAL
Qty: 20 | Refills: 0 | Status: DISCONTINUED | COMMUNITY
Start: 2017-10-24

## 2017-12-29 RX ORDER — ENOXAPARIN SODIUM 100 MG/ML
40 INJECTION SUBCUTANEOUS
Qty: 11 | Refills: 0 | Status: DISCONTINUED | COMMUNITY
Start: 2017-12-21

## 2018-02-05 ENCOUNTER — APPOINTMENT (OUTPATIENT)
Dept: SURGERY | Facility: CLINIC | Age: 43
End: 2018-02-05
Payer: COMMERCIAL

## 2018-02-05 VITALS — HEIGHT: 63 IN | WEIGHT: 185 LBS | BODY MASS INDEX: 32.78 KG/M2

## 2018-02-05 VITALS — DIASTOLIC BLOOD PRESSURE: 80 MMHG | SYSTOLIC BLOOD PRESSURE: 107 MMHG | HEART RATE: 106 BPM | OXYGEN SATURATION: 99 %

## 2018-02-05 DIAGNOSIS — Z87.898 PERSONAL HISTORY OF OTHER SPECIFIED CONDITIONS: ICD-10-CM

## 2018-02-05 PROCEDURE — 99024 POSTOP FOLLOW-UP VISIT: CPT

## 2018-02-12 PROBLEM — Z87.898 HISTORY OF MORBID OBESITY: Status: RESOLVED | Noted: 2017-12-29 | Resolved: 2018-02-12

## 2018-03-02 ENCOUNTER — NON-APPOINTMENT (OUTPATIENT)
Age: 43
End: 2018-03-02

## 2018-03-02 ENCOUNTER — APPOINTMENT (OUTPATIENT)
Dept: CARDIOLOGY | Facility: CLINIC | Age: 43
End: 2018-03-02
Payer: COMMERCIAL

## 2018-03-02 VITALS
WEIGHT: 179 LBS | BODY MASS INDEX: 31.71 KG/M2 | HEART RATE: 92 BPM | SYSTOLIC BLOOD PRESSURE: 110 MMHG | OXYGEN SATURATION: 95 % | DIASTOLIC BLOOD PRESSURE: 76 MMHG | HEIGHT: 63 IN

## 2018-03-02 DIAGNOSIS — Z82.49 FAMILY HISTORY OF ISCHEMIC HEART DISEASE AND OTHER DISEASES OF THE CIRCULATORY SYSTEM: ICD-10-CM

## 2018-03-02 PROCEDURE — 99204 OFFICE O/P NEW MOD 45 MIN: CPT

## 2018-03-02 PROCEDURE — 93000 ELECTROCARDIOGRAM COMPLETE: CPT

## 2018-03-05 ENCOUNTER — APPOINTMENT (OUTPATIENT)
Dept: SURGERY | Facility: CLINIC | Age: 43
End: 2018-03-05
Payer: COMMERCIAL

## 2018-03-05 VITALS
HEART RATE: 84 BPM | OXYGEN SATURATION: 99 % | TEMPERATURE: 98.2 F | HEIGHT: 63 IN | WEIGHT: 179 LBS | RESPIRATION RATE: 15 BRPM | SYSTOLIC BLOOD PRESSURE: 114 MMHG | BODY MASS INDEX: 31.71 KG/M2 | DIASTOLIC BLOOD PRESSURE: 73 MMHG

## 2018-03-05 PROCEDURE — 99024 POSTOP FOLLOW-UP VISIT: CPT

## 2018-03-13 NOTE — H&P PST ADULT - RESPIRATORY
for proper functional mobility as indicated by patients Functional Deficits. 4. Patient will return to normal stair ambulation functional activities without increased symptoms or restriction. 5. Decrease complaints of pain from 3/10 to 0/10       Progression Towards Functional goals:  [x] Patient is progressing as expected towards functional goals listed. [] Progression is slowed due to complexities listed. [] Progression has been slowed due to co-morbidities. [] Plan just implemented, too soon to assess goals progression  [] Other:     ASSESSMENT:  Pt reports feeling more of a workout this visit with increased ankle weight and resistance band. Pt continues to progress and demonstrate improved mechanics with single leg activities.      Treatment/Activity Tolerance:  [x] Patient tolerated treatment well [] Patient limited by fatique  [] Patient limited by pain  [] Patient limited by other medical complications  [] Other:     Prognosis: [x] Good [] Fair  [] Poor    Patient Requires Follow-up: [x] Yes  [] No    PLAN: 2x/week for up to 16 weeks, then [facility] for return to sport  [x] Continue per plan of care [] Alter current plan (see comments)  [] Plan of care initiated [] Hold pending MD visit [] Discharge    Electronically signed by: Ye Colindres, PT
detailed exam

## 2018-06-01 ENCOUNTER — APPOINTMENT (OUTPATIENT)
Dept: CARDIOLOGY | Facility: CLINIC | Age: 43
End: 2018-06-01

## 2018-06-04 ENCOUNTER — APPOINTMENT (OUTPATIENT)
Dept: SURGERY | Facility: CLINIC | Age: 43
End: 2018-06-04

## 2018-07-05 ENCOUNTER — APPOINTMENT (OUTPATIENT)
Dept: INTERNAL MEDICINE | Facility: CLINIC | Age: 43
End: 2018-07-05
Payer: COMMERCIAL

## 2018-07-05 VITALS
OXYGEN SATURATION: 98 % | BODY MASS INDEX: 26.93 KG/M2 | HEIGHT: 63 IN | HEART RATE: 82 BPM | SYSTOLIC BLOOD PRESSURE: 105 MMHG | WEIGHT: 152 LBS | DIASTOLIC BLOOD PRESSURE: 68 MMHG

## 2018-07-05 PROCEDURE — 99214 OFFICE O/P EST MOD 30 MIN: CPT

## 2018-07-05 NOTE — HISTORY OF PRESENT ILLNESS
[FreeTextEntry8] : Cough  seasonal\par gerd zantac and prillosec did not help\par post gastric sleave\par

## 2018-07-05 NOTE — REVIEW OF SYSTEMS
[Nasal Discharge] : nasal discharge [Heartburn] : heartburn [Negative] : Cardiovascular [Wheezing] : no wheezing [Nausea] : no nausea

## 2018-07-05 NOTE — PLAN
[FreeTextEntry1] : cough from allergic rhinitis  trial of medrol dosepak\par gerd trial of aciphex 20

## 2018-10-16 PROBLEM — L02.415 CUTANEOUS ABSCESS OF RIGHT LOWER LIMB: Chronic | Status: ACTIVE | Noted: 2017-12-08

## 2018-10-16 PROBLEM — N83.209 UNSPECIFIED OVARIAN CYST, UNSPECIFIED SIDE: Chronic | Status: ACTIVE | Noted: 2017-12-08

## 2018-10-16 PROBLEM — E66.01 MORBID (SEVERE) OBESITY DUE TO EXCESS CALORIES: Chronic | Status: ACTIVE | Noted: 2017-12-08

## 2018-10-31 ENCOUNTER — MEDICATION RENEWAL (OUTPATIENT)
Age: 43
End: 2018-10-31

## 2018-11-12 ENCOUNTER — APPOINTMENT (OUTPATIENT)
Dept: INTERNAL MEDICINE | Facility: CLINIC | Age: 43
End: 2018-11-12
Payer: COMMERCIAL

## 2018-11-12 VITALS
SYSTOLIC BLOOD PRESSURE: 122 MMHG | DIASTOLIC BLOOD PRESSURE: 70 MMHG | WEIGHT: 162 LBS | HEIGHT: 63 IN | BODY MASS INDEX: 28.7 KG/M2

## 2018-11-12 VITALS — TEMPERATURE: 99.4 F

## 2018-11-12 DIAGNOSIS — Z01.419 ENCOUNTER FOR GYNECOLOGICAL EXAMINATION (GENERAL) (ROUTINE) W/OUT ABNORMAL FINDINGS: ICD-10-CM

## 2018-11-12 DIAGNOSIS — Z01.818 ENCOUNTER FOR OTHER PREPROCEDURAL EXAMINATION: ICD-10-CM

## 2018-11-12 DIAGNOSIS — Z98.890 OTHER SPECIFIED POSTPROCEDURAL STATES: ICD-10-CM

## 2018-11-12 DIAGNOSIS — R73.09 OTHER ABNORMAL GLUCOSE: ICD-10-CM

## 2018-11-12 DIAGNOSIS — E66.9 OBESITY, UNSPECIFIED: ICD-10-CM

## 2018-11-12 DIAGNOSIS — L02.415 CUTANEOUS ABSCESS OF RIGHT LOWER LIMB: ICD-10-CM

## 2018-11-12 DIAGNOSIS — Z87.42 PERSONAL HISTORY OF OTHER DISEASES OF THE FEMALE GENITAL TRACT: ICD-10-CM

## 2018-11-12 DIAGNOSIS — Z87.09 PERSONAL HISTORY OF OTHER DISEASES OF THE RESPIRATORY SYSTEM: ICD-10-CM

## 2018-11-12 DIAGNOSIS — R60.0 LOCALIZED EDEMA: ICD-10-CM

## 2018-11-12 PROCEDURE — 36415 COLL VENOUS BLD VENIPUNCTURE: CPT

## 2018-11-12 PROCEDURE — 99214 OFFICE O/P EST MOD 30 MIN: CPT | Mod: 25

## 2018-11-12 RX ORDER — ALBUTEROL SULFATE 90 UG/1
108 (90 BASE) AEROSOL, METERED RESPIRATORY (INHALATION)
Qty: 9 | Refills: 0 | Status: DISCONTINUED | COMMUNITY
Start: 2017-10-18 | End: 2018-11-12

## 2018-11-12 RX ORDER — METHYLPREDNISOLONE 4 MG/1
4 TABLET ORAL
Qty: 21 | Refills: 0 | Status: DISCONTINUED | COMMUNITY
Start: 2018-07-05 | End: 2018-11-12

## 2018-11-12 RX ORDER — LORAZEPAM 0.5 MG/1
0.5 TABLET ORAL
Qty: 60 | Refills: 0 | Status: ACTIVE | COMMUNITY
Start: 2018-11-12 | End: 1900-01-01

## 2018-11-12 NOTE — HISTORY OF PRESENT ILLNESS
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

## 2018-11-12 NOTE — REVIEW OF SYSTEMS
[Fever] : no fever [Night Sweats] : no night sweats [Chest Pain] : no chest pain [Orthopnea] : no orthopnea

## 2018-11-13 LAB
24R-OH-CALCIDIOL SERPL-MCNC: 73.4 PG/ML
25(OH)D3 SERPL-MCNC: 27.5 NG/ML
ALBUMIN SERPL ELPH-MCNC: 4.1 G/DL
ALP BLD-CCNC: 108 U/L
ALT SERPL-CCNC: 13 U/L
ANION GAP SERPL CALC-SCNC: 12 MMOL/L
AST SERPL-CCNC: 13 U/L
BASOPHILS # BLD AUTO: 0.03 K/UL
BASOPHILS NFR BLD AUTO: 0.5 %
BILIRUB SERPL-MCNC: 0.2 MG/DL
BUN SERPL-MCNC: 12 MG/DL
CALCIUM SERPL-MCNC: 8.9 MG/DL
CHLORIDE SERPL-SCNC: 107 MMOL/L
CHOLEST SERPL-MCNC: 166 MG/DL
CHOLEST/HDLC SERPL: 2.9 RATIO
CO2 SERPL-SCNC: 22 MMOL/L
CREAT SERPL-MCNC: 0.58 MG/DL
EOSINOPHIL # BLD AUTO: 0.07 K/UL
EOSINOPHIL NFR BLD AUTO: 1.3 %
FERRITIN SERPL-MCNC: 3 NG/ML
GLUCOSE SERPL-MCNC: 111 MG/DL
HBA1C MFR BLD HPLC: 5.5 %
HCT VFR BLD CALC: 25 %
HDLC SERPL-MCNC: 57 MG/DL
HGB BLD-MCNC: 7.5 G/DL
IMM GRANULOCYTES NFR BLD AUTO: 0.4 %
IRON SATN MFR SERPL: 2 %
IRON SERPL-MCNC: 10 UG/DL
LDLC SERPL CALC-MCNC: 85 MG/DL
LYMPHOCYTES # BLD AUTO: 1.89 K/UL
LYMPHOCYTES NFR BLD AUTO: 34.4 %
MAN DIFF?: NORMAL
MCHC RBC-ENTMCNC: 19.4 PG
MCHC RBC-ENTMCNC: 30 GM/DL
MCV RBC AUTO: 64.8 FL
MONOCYTES # BLD AUTO: 0.67 K/UL
MONOCYTES NFR BLD AUTO: 12.2 %
NEUTROPHILS # BLD AUTO: 2.81 K/UL
NEUTROPHILS NFR BLD AUTO: 51.2 %
PLATELET # BLD AUTO: 367 K/UL
POTASSIUM SERPL-SCNC: 3.9 MMOL/L
PROT SERPL-MCNC: 6.6 G/DL
RBC # BLD: 3.86 M/UL
RBC # FLD: 17.5 %
SODIUM SERPL-SCNC: 141 MMOL/L
T3FREE SERPL-MCNC: 2.67 PG/ML
T4 FREE SERPL-MCNC: 1.3 NG/DL
T4 SERPL-MCNC: 7.2 UG/DL
TIBC SERPL-MCNC: 412 UG/DL
TRIGL SERPL-MCNC: 121 MG/DL
TSH SERPL-ACNC: 0.01 UIU/ML
UIBC SERPL-MCNC: 402 UG/DL
WBC # FLD AUTO: 5.49 K/UL

## 2018-11-14 LAB
THYROGLOB AB SERPL-ACNC: 173 IU/ML
THYROPEROXIDASE AB SERPL IA-ACNC: 337 IU/ML

## 2019-01-14 ENCOUNTER — APPOINTMENT (OUTPATIENT)
Dept: INTERNAL MEDICINE | Facility: CLINIC | Age: 44
End: 2019-01-14
Payer: COMMERCIAL

## 2019-01-14 ENCOUNTER — LABORATORY RESULT (OUTPATIENT)
Age: 44
End: 2019-01-14

## 2019-01-14 VITALS
HEART RATE: 70 BPM | DIASTOLIC BLOOD PRESSURE: 70 MMHG | WEIGHT: 164 LBS | BODY MASS INDEX: 29.06 KG/M2 | HEIGHT: 63 IN | SYSTOLIC BLOOD PRESSURE: 120 MMHG

## 2019-01-14 LAB
BILIRUB UR QL STRIP: NEGATIVE
CLARITY UR: CLEAR
COLLECTION METHOD: NORMAL
GLUCOSE UR-MCNC: NEGATIVE
HCG UR QL: 0.2 EU/DL
HGB UR QL STRIP.AUTO: NORMAL
KETONES UR-MCNC: NEGATIVE
LEUKOCYTE ESTERASE UR QL STRIP: NEGATIVE
NITRITE UR QL STRIP: NEGATIVE
PH UR STRIP: 6.5
PROT UR STRIP-MCNC: NEGATIVE
SP GR UR STRIP: 1.01

## 2019-01-14 PROCEDURE — 81003 URINALYSIS AUTO W/O SCOPE: CPT | Mod: QW

## 2019-01-14 PROCEDURE — 36415 COLL VENOUS BLD VENIPUNCTURE: CPT

## 2019-01-14 PROCEDURE — 99214 OFFICE O/P EST MOD 30 MIN: CPT | Mod: 25

## 2019-01-14 NOTE — HISTORY OF PRESENT ILLNESS
[FreeTextEntry1] : anemia f/u [de-identified] : Anemia f/u\par s/p gastric sleeve\par heavy mences\par s/p severe anemia , iron deficiency\par on iron for f/u\par need aciphex to exist\par history of gallstones\par dorita right upper quadrant pain\par on low fat diet\par feel well today

## 2019-01-14 NOTE — PLAN
[FreeTextEntry1] : Anemia to check count and iron\par history of gallbladder stone\par history of endometriosis\par consider sono abd/kidney\par Hida?

## 2019-01-14 NOTE — PHYSICAL EXAM
[No Acute Distress] : no acute distress [Normal Outer Ear/Nose] : the outer ears and nose were normal in appearance [Normal Oropharynx] : the oropharynx was normal [No JVD] : no jugular venous distention [Supple] : supple [No Respiratory Distress] : no respiratory distress  [Clear to Auscultation] : lungs were clear to auscultation bilaterally [Normal Rate] : normal rate  [Regular Rhythm] : with a regular rhythm [No HSM] : no HSM [Normal Bowel Sounds] : normal bowel sounds

## 2019-01-14 NOTE — REVIEW OF SYSTEMS
[Fever] : no fever [Nasal Discharge] : no nasal discharge [Chest Pain] : no chest pain [Orthopnea] : no orthopnea [Dysuria] : no dysuria [Hematuria] : no hematuria [Joint Pain] : no joint pain [Muscle Pain] : no muscle pain

## 2019-01-15 LAB
ALBUMIN SERPL ELPH-MCNC: 4.6 G/DL
ALP BLD-CCNC: 93 U/L
ALT SERPL-CCNC: 12 U/L
AMYLASE/CREAT SERPL: 77 U/L
ANION GAP SERPL CALC-SCNC: 9 MMOL/L
AST SERPL-CCNC: 14 U/L
BASOPHILS # BLD AUTO: 0.04 K/UL
BASOPHILS NFR BLD AUTO: 0.7 %
BILIRUB SERPL-MCNC: 0.2 MG/DL
BUN SERPL-MCNC: 11 MG/DL
CALCIUM SERPL-MCNC: 9.7 MG/DL
CHLORIDE SERPL-SCNC: 107 MMOL/L
CO2 SERPL-SCNC: 24 MMOL/L
CREAT SERPL-MCNC: 0.59 MG/DL
EOSINOPHIL # BLD AUTO: 0.07 K/UL
EOSINOPHIL NFR BLD AUTO: 1.3 %
FERRITIN SERPL-MCNC: 5 NG/ML
GLUCOSE SERPL-MCNC: 89 MG/DL
HCT VFR BLD CALC: 25.1 %
HGB BLD-MCNC: 7.1 G/DL
IMM GRANULOCYTES NFR BLD AUTO: 0.5 %
IRON SATN MFR SERPL: 3 %
IRON SERPL-MCNC: 11 UG/DL
LPL SERPL-CCNC: 34 U/L
LYMPHOCYTES # BLD AUTO: 2.32 K/UL
LYMPHOCYTES NFR BLD AUTO: 42 %
MAN DIFF?: NORMAL
MCHC RBC-ENTMCNC: 18.2 PG
MCHC RBC-ENTMCNC: 28.3 GM/DL
MCV RBC AUTO: 64.4 FL
MONOCYTES # BLD AUTO: 0.68 K/UL
MONOCYTES NFR BLD AUTO: 12.3 %
NEUTROPHILS # BLD AUTO: 2.38 K/UL
NEUTROPHILS NFR BLD AUTO: 43.2 %
PLATELET # BLD AUTO: 309 K/UL
POTASSIUM SERPL-SCNC: 4.7 MMOL/L
PROT SERPL-MCNC: 6.6 G/DL
RBC # BLD: 3.9 M/UL
RBC # FLD: 19.5 %
SODIUM SERPL-SCNC: 140 MMOL/L
TIBC SERPL-MCNC: 416 UG/DL
UIBC SERPL-MCNC: 405 UG/DL
WBC # FLD AUTO: 5.52 K/UL

## 2019-02-04 ENCOUNTER — APPOINTMENT (OUTPATIENT)
Dept: OBGYN | Facility: CLINIC | Age: 44
End: 2019-02-04
Payer: COMMERCIAL

## 2019-02-04 VITALS
HEART RATE: 99 BPM | SYSTOLIC BLOOD PRESSURE: 128 MMHG | DIASTOLIC BLOOD PRESSURE: 79 MMHG | WEIGHT: 166 LBS | HEIGHT: 63 IN | BODY MASS INDEX: 29.41 KG/M2

## 2019-02-04 PROCEDURE — 99396 PREV VISIT EST AGE 40-64: CPT

## 2019-02-04 RX ORDER — DIPHENHYDRAMINE HCL 12.5 MG/5ML
25 MCG LIQUID ORAL
Refills: 0 | Status: DISCONTINUED | COMMUNITY
End: 2019-02-04

## 2019-02-04 RX ORDER — MENTHOL 5.8 MG/1
100 LOZENGE ORAL
Refills: 0 | Status: DISCONTINUED | COMMUNITY
End: 2019-02-04

## 2019-02-04 NOTE — REVIEW OF SYSTEMS
[Feeling Tired] : feeling tired [Abn Vag Bleeding] : abnormal vaginal bleeding [Nl] : Psychiatric [Vomiting] : no vomiting [Diarrhea] : no diarrhea [Dysuria] : no dysuria [Pelvic Pain] : no pelvic pain

## 2019-02-04 NOTE — PHYSICAL EXAM
[Awake] : awake [Alert] : alert [Soft] : soft [Oriented x3] : oriented to person, place, and time [No Lesions] : no genitalia lesions [Normal] : cervix [Labia Majora] : labia major [Labia Minora] : labia minora [Moderate] : there was moderate vaginal bleeding [Pap Obtained] : a Pap smear was performed [Enlarged ___ wks] : enlarged [unfilled] ~Uweeks [Acute Distress] : no acute distress [Mass] : no breast mass [Nipple Discharge] : no nipple discharge [Axillary LAD] : no axillary lymphadenopathy [Tender] : non tender [Distended] : not distended [Depressed Mood] : not depressed [Flat Affect] : affect not flat [Motion Tenderness] : there was no cervical motion tenderness [Tenderness] : nontender [Adnexa Tenderness] : were not tender

## 2019-02-05 ENCOUNTER — APPOINTMENT (OUTPATIENT)
Dept: OBGYN | Facility: CLINIC | Age: 44
End: 2019-02-05
Payer: MEDICARE

## 2019-02-05 ENCOUNTER — EMERGENCY (EMERGENCY)
Facility: HOSPITAL | Age: 44
LOS: 1 days | Discharge: HOME | End: 2019-02-05
Attending: EMERGENCY MEDICINE
Payer: COMMERCIAL

## 2019-02-05 ENCOUNTER — ASOB RESULT (OUTPATIENT)
Age: 44
End: 2019-02-05

## 2019-02-05 ENCOUNTER — OTHER (OUTPATIENT)
Age: 44
End: 2019-02-05

## 2019-02-05 VITALS
OXYGEN SATURATION: 100 % | DIASTOLIC BLOOD PRESSURE: 78 MMHG | SYSTOLIC BLOOD PRESSURE: 120 MMHG | WEIGHT: 164.91 LBS | HEIGHT: 63 IN | HEART RATE: 86 BPM | RESPIRATION RATE: 18 BRPM

## 2019-02-05 DIAGNOSIS — Z90.49 ACQUIRED ABSENCE OF OTHER SPECIFIED PARTS OF DIGESTIVE TRACT: Chronic | ICD-10-CM

## 2019-02-05 DIAGNOSIS — Z98.891 HISTORY OF UTERINE SCAR FROM PREVIOUS SURGERY: Chronic | ICD-10-CM

## 2019-02-05 DIAGNOSIS — N93.9 ABNORMAL UTERINE AND VAGINAL BLEEDING, UNSPECIFIED: ICD-10-CM

## 2019-02-05 DIAGNOSIS — Z98.890 OTHER SPECIFIED POSTPROCEDURAL STATES: Chronic | ICD-10-CM

## 2019-02-05 LAB
ALBUMIN SERPL ELPH-MCNC: 4.6 G/DL — SIGNIFICANT CHANGE UP (ref 3.3–5)
ALP SERPL-CCNC: 86 U/L — SIGNIFICANT CHANGE UP (ref 40–120)
ALT FLD-CCNC: 9 U/L — LOW (ref 10–45)
ANION GAP SERPL CALC-SCNC: 15 MMOL/L — SIGNIFICANT CHANGE UP (ref 5–17)
ANISOCYTOSIS BLD QL: SLIGHT — SIGNIFICANT CHANGE UP
APTT BLD: 26.5 SEC — LOW (ref 27.5–36.3)
AST SERPL-CCNC: 14 U/L — SIGNIFICANT CHANGE UP (ref 10–40)
BASOPHILS # BLD AUTO: 0.1 K/UL — SIGNIFICANT CHANGE UP (ref 0–0.2)
BASOPHILS NFR BLD AUTO: 1.6 % — SIGNIFICANT CHANGE UP (ref 0–2)
BILIRUB SERPL-MCNC: 0.3 MG/DL — SIGNIFICANT CHANGE UP (ref 0.2–1.2)
BLD GP AB SCN SERPL QL: NEGATIVE — SIGNIFICANT CHANGE UP
BUN SERPL-MCNC: 9 MG/DL — SIGNIFICANT CHANGE UP (ref 7–23)
CALCIUM SERPL-MCNC: 9.2 MG/DL — SIGNIFICANT CHANGE UP (ref 8.4–10.5)
CHLORIDE SERPL-SCNC: 105 MMOL/L — SIGNIFICANT CHANGE UP (ref 96–108)
CO2 SERPL-SCNC: 21 MMOL/L — LOW (ref 22–31)
CREAT SERPL-MCNC: 0.67 MG/DL — SIGNIFICANT CHANGE UP (ref 0.5–1.3)
DACRYOCYTES BLD QL SMEAR: SLIGHT — SIGNIFICANT CHANGE UP
ELLIPTOCYTES BLD QL SMEAR: SLIGHT — SIGNIFICANT CHANGE UP
EOSINOPHIL # BLD AUTO: 0.1 K/UL — SIGNIFICANT CHANGE UP (ref 0–0.5)
EOSINOPHIL NFR BLD AUTO: 1.5 % — SIGNIFICANT CHANGE UP (ref 0–6)
GLUCOSE SERPL-MCNC: 93 MG/DL — SIGNIFICANT CHANGE UP (ref 70–99)
HCT VFR BLD CALC: 26.8 % — LOW (ref 34.5–45)
HGB BLD-MCNC: 8.1 G/DL — LOW (ref 11.5–15.5)
HYPOCHROMIA BLD QL: SIGNIFICANT CHANGE UP
INR BLD: 1.04 RATIO — SIGNIFICANT CHANGE UP (ref 0.88–1.16)
LIDOCAIN IGE QN: 29 U/L — SIGNIFICANT CHANGE UP (ref 7–60)
LYMPHOCYTES # BLD AUTO: 1.9 K/UL — SIGNIFICANT CHANGE UP (ref 1–3.3)
LYMPHOCYTES # BLD AUTO: 42.2 % — SIGNIFICANT CHANGE UP (ref 13–44)
MACROCYTES BLD QL: SLIGHT — SIGNIFICANT CHANGE UP
MCHC RBC-ENTMCNC: 19.2 PG — LOW (ref 27–34)
MCHC RBC-ENTMCNC: 30.2 GM/DL — LOW (ref 32–36)
MCV RBC AUTO: 63.7 FL — LOW (ref 80–100)
MICROCYTES BLD QL: SLIGHT — SIGNIFICANT CHANGE UP
MONOCYTES # BLD AUTO: 0.7 K/UL — SIGNIFICANT CHANGE UP (ref 0–0.9)
MONOCYTES NFR BLD AUTO: 14.7 % — HIGH (ref 2–14)
NEUTROPHILS # BLD AUTO: 1.8 K/UL — SIGNIFICANT CHANGE UP (ref 1.8–7.4)
NEUTROPHILS NFR BLD AUTO: 40.1 % — LOW (ref 43–77)
PLAT MORPH BLD: NORMAL — SIGNIFICANT CHANGE UP
PLATELET # BLD AUTO: 334 K/UL — SIGNIFICANT CHANGE UP (ref 150–400)
POIKILOCYTOSIS BLD QL AUTO: SLIGHT — SIGNIFICANT CHANGE UP
POLYCHROMASIA BLD QL SMEAR: SLIGHT — SIGNIFICANT CHANGE UP
POTASSIUM SERPL-MCNC: 3.8 MMOL/L — SIGNIFICANT CHANGE UP (ref 3.5–5.3)
POTASSIUM SERPL-SCNC: 3.8 MMOL/L — SIGNIFICANT CHANGE UP (ref 3.5–5.3)
PROT SERPL-MCNC: 7.3 G/DL — SIGNIFICANT CHANGE UP (ref 6–8.3)
PROTHROM AB SERPL-ACNC: 11.9 SEC — SIGNIFICANT CHANGE UP (ref 10–12.9)
RBC # BLD: 4.2 M/UL — SIGNIFICANT CHANGE UP (ref 3.8–5.2)
RBC # FLD: 19.5 % — HIGH (ref 10.3–14.5)
RBC BLD AUTO: ABNORMAL
RH IG SCN BLD-IMP: POSITIVE — SIGNIFICANT CHANGE UP
SODIUM SERPL-SCNC: 141 MMOL/L — SIGNIFICANT CHANGE UP (ref 135–145)
VIT B12 SERPL-MCNC: 391 PG/ML — SIGNIFICANT CHANGE UP (ref 232–1245)
WBC # BLD: 4.6 K/UL — SIGNIFICANT CHANGE UP (ref 3.8–10.5)
WBC # FLD AUTO: 4.6 K/UL — SIGNIFICANT CHANGE UP (ref 3.8–10.5)

## 2019-02-05 PROCEDURE — 99284 EMERGENCY DEPT VISIT MOD MDM: CPT

## 2019-02-05 PROCEDURE — 76857 US EXAM PELVIC LIMITED: CPT

## 2019-02-05 PROCEDURE — 76830 TRANSVAGINAL US NON-OB: CPT

## 2019-02-05 PROCEDURE — 74177 CT ABD & PELVIS W/CONTRAST: CPT | Mod: 26

## 2019-02-05 RX ORDER — SODIUM CHLORIDE 9 MG/ML
1000 INJECTION INTRAMUSCULAR; INTRAVENOUS; SUBCUTANEOUS ONCE
Qty: 0 | Refills: 0 | Status: COMPLETED | OUTPATIENT
Start: 2019-02-05 | End: 2019-02-05

## 2019-02-05 RX ORDER — ONDANSETRON 8 MG/1
4 TABLET, FILM COATED ORAL ONCE
Qty: 0 | Refills: 0 | Status: COMPLETED | OUTPATIENT
Start: 2019-02-05 | End: 2019-02-05

## 2019-02-05 RX ORDER — ACETAMINOPHEN 500 MG
1000 TABLET ORAL ONCE
Qty: 0 | Refills: 0 | Status: COMPLETED | OUTPATIENT
Start: 2019-02-05 | End: 2019-02-05

## 2019-02-05 RX ORDER — FAMOTIDINE 10 MG/ML
20 INJECTION INTRAVENOUS ONCE
Qty: 0 | Refills: 0 | Status: COMPLETED | OUTPATIENT
Start: 2019-02-05 | End: 2019-02-05

## 2019-02-05 RX ADMIN — SODIUM CHLORIDE 1000 MILLILITER(S): 9 INJECTION INTRAMUSCULAR; INTRAVENOUS; SUBCUTANEOUS at 15:32

## 2019-02-05 RX ADMIN — FAMOTIDINE 20 MILLIGRAM(S): 10 INJECTION INTRAVENOUS at 12:22

## 2019-02-05 RX ADMIN — Medication 30 MILLILITER(S): at 12:21

## 2019-02-05 NOTE — ED ADULT NURSE NOTE - NSIMPLEMENTINTERV_GEN_ALL_ED
Implemented All Universal Safety Interventions:  [place] to call system. Call bell, personal items and telephone within reach. Instruct patient to call for assistance. Room bathroom lighting operational. Non-slip footwear when patient is off stretcher. Physically safe environment: no spills, clutter or unnecessary equipment. Stretcher in lowest position, wheels locked, appropriate side rails in place.

## 2019-02-05 NOTE — CONSULT NOTE ADULT - ASSESSMENT
Assessment/Plan: 43y Female s/p sleeve gastrectomy in 2017 presents with LUQ abdominal pain.   Labs within normal.     - No acute pathology on CT  - PO challenge before discharge    Discussed with MIS Fellow, Dr. Anton  Pager [phone] Assessment/Plan: 43y Female s/p sleeve gastrectomy in 2017 presents with LUQ abdominal pain.   Labs within normal.     - No acute pathology on CT  - PO challenge before discharge    Discussed with MIS Fellow, Dr. Anton  Pager [phone]        Addendum:  Labs and CT not concerning for acute intra-abdominal pathology  If pain persists can consider outpatient EGD  Should follow up outpatient with bariatric surgery

## 2019-02-05 NOTE — CONSULT NOTE ADULT - PROBLEM SELECTOR RECOMMENDATION 9
- pad counts  - f/u CBC to evaluate if RBC transfusion is necessary  - TVUS not necessary at this time as records outpatient records were obtained  - Per records, plan is to place mirena IUD once cleared by insurance  - consider IV tylenol for pain management. Avoid NSAID 2/2 history of GERD per patient. Will defer to ED for management of LUQ pain.    d/w Dr. Ricketts and Dr. Cynthia Turner pgy2

## 2019-02-05 NOTE — ED PROVIDER NOTE - MEDICAL DECISION MAKING DETAILS
43hx fibroids, endometriosis, sleeve gastrectomy p/w vag bleed. stable vag bleed. Will get labs, surg and gyn consult since plugged into [facility]. Likely d/c home with f/u 43hx fibroids, endometriosis, sleeve gastrectomy p/w vag bleed. stable vag bleed. Will get labs, surg and gyn consult since plugged into [facility].   [place]: Patient with luq pain x 1 day. Patient also reports dizziness heavy vaginal bleeding. saw ob this am and advised to come to ER if felt dizzy. no increase in bleeding. will get labs, ivf, pain control, gi cocktail, ob consult, surgery consult given patient has gastric sleeve. reassess.

## 2019-02-05 NOTE — CONSULT NOTE ADULT - SUBJECTIVE AND OBJECTIVE BOX
Bariatric Surgery Consult Note  Pager [phone]    HPI:  43-year-old woman s/p sleeve gastrectomy in 2017 presents with LUQ abdominal pain for one day. Denies nausea/vomiting. Normal BM. Does not smoke. Drinks 1 glass of wine per week.     PAST MEDICAL & SURGICAL HISTORY:  Morbid (severe) obesity due to excess calories  Abscess of right leg: history - resolved  Cyst of ovary: s/p removal  Anemia  Asthma: last attack was at age of 22 - no meds now  H/O ovarian cystectomy  S/P  section: x 2-  ,   History of appendectomy:       ALLERGIES:  NKA    HOME MEDICATIONS:  None    SOCIAL HISTORY:  Denies smoking and ETOH use. Lives with family.      FAMILY HISTORY:  No pertinent history in first degree relatives.  ___________________________________________  REVIEW OF SYSTEMS:  Constitutional: No fevers, chills, no recent weight loss  ENMT: No changes in hearing, no changes in vision, no sore throat, no cough  Respiratory: No shortness of breath  Cardiovascular: No chest pain, palpitations  Gastrointestinal: No abdominal pain, no diarrhea/constipation  Genitourinary: No dysuria, frequency, or urgency    Extremities: No joint swelling, no limited range of movement  Neurological: No paresthesia  Skin: No rashes  ___________________________________________  PHYSICAL EXAM:  Vital Signs Last 24 Hrs  T(C): --  T(F): --  HR: 86 (2019 09:55) (86 - 86)  BP: 120/78 (2019 09:55) (120/78 - 120/78)  BP(mean): --  RR: 18 (2019 09:55) (18 - 18)  SpO2: 100% (2019 09:55) (100% - 100%)CAPILLARY BLOOD GLUCOSE        General: A&Ox3, NAD.  Neuro: Motor and sensory grossly intact with no focal deficits.  HEENT: Anicteric sclerae.  Respiratory: Unlabored breathing.   CVS: Regular rate and rhythm.  Abdomen: Soft, non-distended, LUQ tenderness. No rebound or guarding.  Extremities: Warm bilaterally w/ palpable pulses.   MSK: Intact ROM.  ____________________________________________  LABS:  CBC Full  -  ( 2019 12:11 )  WBC Count : 4.6 K/uL  Hemoglobin : 8.1 g/dL  Hematocrit : 26.8 %  Platelet Count - Automated : 334 K/uL  Mean Cell Volume : 63.7 fl  Mean Cell Hemoglobin : 19.2 pg  Mean Cell Hemoglobin Concentration : 30.2 gm/dL  Auto Neutrophil # : 1.8 K/uL  Auto Lymphocyte # : 1.9 K/uL  Auto Monocyte # : 0.7 K/uL  Auto Eosinophil # : 0.1 K/uL  Auto Basophil # : 0.1 K/uL  Auto Neutrophil % : 40.1 %  Auto Lymphocyte % : 42.2 %  Auto Monocyte % : 14.7 %  Auto Eosinophil % : 1.5 %  Auto Basophil % : 1.6 %        141  |  105  |  9   ----------------------------<  93  3.8   |  21<L>  |  0.67    Ca    9.2      2019 12:11    TPro  7.3  /  Alb  4.6  /  TBili  0.3  /  DBili  x   /  AST  14  /  ALT  9<L>  /  AlkPhos  86  02-05    LIVER FUNCTIONS - ( 2019 12:11 )  Alb: 4.6 g/dL / Pro: 7.3 g/dL / ALK PHOS: 86 U/L / ALT: 9 U/L / AST: 14 U/L / GGT: x           PT/INR - ( 2019 12:11 )   PT: 11.9 sec;   INR: 1.04 ratio         PTT - ( 2019 12:11 )  PTT:26.5 sec    ____________________________________________  RADIOLOGY:  CT Abdomen and Pelvis w/ Oral Cont and w/ IV Cont (19 @ 16:23)   LIVER: Within normal limits.  BILE DUCTS: Normal caliber.  GALLBLADDER: Within normal limits.  SPLEEN: Within normal limits.  PANCREAS: Within normal limits.  ADRENALS: Within normal limits.  KIDNEYS/URETERS: A subcentimeter hypodense left renal focus, too small to   characterize.    BLADDER: Within normal limits.  REPRODUCTIVE ORGANS: Fibroid uterus.    BOWEL: Status post sleeve gastrectomy. No bowel obstruction.   Appendectomy.   PERITONEUM:No ascites.  VESSELS:  Splenic vein, SMV and portal veins are patent.  RETROPERITONEUM: No lymphadenopathy.    ABDOMINAL WALL: Within normal limits.  BONES: Within normal limits.    IMPRESSION: No acute pathology.

## 2019-02-05 NOTE — CONSULT NOTE ADULT - SUBJECTIVE AND OBJECTIVE BOX
R2 Consult Note    43y  LMP 19 presents with LUQ pain and dizziness. She states that her pain has been occurring for the past two days. She states that the pain is non-radiating, burning pain that has intermittent episodes of sharp pain. She states the pain is 6/10 and took tylenol at home with minimal relief. She denies any aggravating factors. She additionally reports lightheadedness and dizziness for the past two days. She has been experiencing her menses for the past 5 days; she states that the first few days were heavy, soaking thru 1 pad/hour. She states that today her bleeding has improved and described bleeding has been moderate today. She states that this menstrual period is consistent with her regular cycle. She additionally received an outpatient ultrasound this morning by her GYN's office. Denies fevers, chills, sweats, CP, SOB, palpitations, N/V, dysuria, urinary urgency/frequency.    OB/GYN HISTORY:   Alger: 3  Parity: 2  Term: 2  FT pLTCS for placenta previa,  FT rLTCS c/b preeclampsia  : 0  MAB/TAB/Ectopic: 1 sab  Living: 3    Last Menstrual Period 19    Name of GYN Physician: Dr. Greenwood  Date of Last Pap: 19  History of adenomyosis, fibroids and ovarian cysts  R ovarian cystectomy   Current OCP use: tubal ligation  Plan per [employer] for menorrhagia is mirena IUD      PAST MEDICAL & SURGICAL HISTORY:  Morbid (severe) obesity due to excess calories  Abscess of right leg: history - resolved  Cyst of ovary: s/p removal  Anemia- was previously taking liquid iron but discontinued it after experiencing reflux symptoms  Asthma: last attack was at age of 22 - no meds now  H/O ovarian cystectomy  S/P  section: x 2-  ,   History of appendectomy:       REVIEW OF SYSTEMS  General:	  Respiratory and Thorax:  Cardiovascular:	  Gastrointestinal:	  Genitourinary:	    MEDICATIONS  (STANDING):  aluminum hydroxide/magnesium hydroxide/simethicone Suspension 30 milliLiter(s) Oral once  famotidine Injectable 20 milliGRAM(s) IV Push Once    MEDICATIONS  (PRN):      Allergies  No Known Allergies    SOCIAL HISTORY: denies x3    FAMILY HISTORY: Father - HTN, DM, recently . Mother - DM. Sister- pre-diabetes    Vital Signs Last 24 Hrs  T(C): --  T(F): --  HR: 86 (2019 09:55) (86 - 86)  BP: 120/78 (2019 09:55) (120/78 - 120/78)  BP(mean): --  RR: 18 (2019 09:55) (18 - 18)  SpO2: 100% (2019 09:55) (100% - 100%)    PHYSICAL EXAM:  Constitutional: alert and oriented x 3  Respiratory: CTA-B, symmetrical expansion  Cardiovascular: RRR, no murmurs, S1, S2  Gastrointestinal: soft, non tender, + bowel sounds. No hepatosplenomegaly, no palpable masses  Genitourinary: NEFG  Cervix: closed/ long, no CMT, small amount of dark red blood in vault  Uterus: enlarged size, non tender  Adnexa: non tender, no palpable masses                Radiology:   TVUS (19) performed in office today   Uterus- anteverted, retroflex, 13.26x10.31x7.46cm, heterogenous uterus with fibroids, 3cm subserosal posterior fibroid, 1.9 R intramural fibroid  EMS: 9mm, homogenous  Adnexa: WNL  Comment - some slight subjective swelling of bowel wall noted  Impression  13.3x10.3x7.5 fibroid uterus s/p   Appearance of the uterine wall again suggesetive of adenomyosis  No abnormal adnexal mass or fluid collection  Interval resolution of previously visualized adnexal cysts  Subjective bowel wall swelling

## 2019-02-05 NOTE — ED PROVIDER NOTE - PROGRESS NOTE DETAILS
TATYANA PGy2: Patient seen and examined by gyn and surgery. CTa/p negative. discussed results with patient - feels imprvoed. Will d/c home with f/u precautions

## 2019-02-05 NOTE — CONSULT NOTE ADULT - ASSESSMENT
Medication is being filled for 1 time refill only due to:  Patient needs to be seen because it has been more than one year since last visit.     44 yo  female LMP  presents with 2 day history of LUQ pain and lightheadedness. GYN consulted for vaginal bleeding, which per patient is consistent with her menstrual cycles and states that bleeding is improved since this morning. Lightheadedness likely associated with acute blood loss anemia as CBC on  was 7.1/25. LUQ pain is unlikely related to menstrual cycle.

## 2019-02-05 NOTE — ED ADULT NURSE NOTE - OBJECTIVE STATEMENT
Pt came in with vaginal bleeding and abdominal pain. Pt stated she went to her og/gyn MD yesterday. No distress. Breathing easy and non labored. Ambulatory. Pt is not diaphoretic. No chills. No diaphoresis. Emotional support offered. Alert and orientedX4

## 2019-02-05 NOTE — ED PROVIDER NOTE - OBJECTIVE STATEMENT
43F hx dysmenorrhagia, fibroids, endometriosis, sleeve gastrectomy 2017 off iron/b12 supplements p/w dizziness. Patient reports hx of anemia with "low iron and a level of 7" likely 2/2 her menstrual bleeding. Was told by PMD to come to ER if she ever has symptoms of anemia. Menses started 4 days ago, changing pads once per hours with clots. Complaining of intermittent dizziness rony when she stands up. Stopped taking Iron supplement because it burned her stomach. Denies cp, sob, focal weakness, difficulty walking, speaking swallowing. Was seen by obgyn in clinic this morning.    OBGYN: Meliza  Sleeve surgeon: Catalina

## 2019-02-06 ENCOUNTER — OTHER (OUTPATIENT)
Age: 44
End: 2019-02-06

## 2019-02-06 ENCOUNTER — MEDICATION RENEWAL (OUTPATIENT)
Age: 44
End: 2019-02-06

## 2019-02-06 LAB
HCG SERPL-MCNC: <1 MIU/ML
HPV HIGH+LOW RISK DNA PNL CVX: NOT DETECTED
T4 FREE SERPL-MCNC: 0.7 NG/DL
TSH SERPL-ACNC: 8.44 UIU/ML

## 2019-02-06 PROCEDURE — 86901 BLOOD TYPING SEROLOGIC RH(D): CPT

## 2019-02-06 PROCEDURE — 82607 VITAMIN B-12: CPT

## 2019-02-06 PROCEDURE — 85610 PROTHROMBIN TIME: CPT

## 2019-02-06 PROCEDURE — 86900 BLOOD TYPING SEROLOGIC ABO: CPT

## 2019-02-06 PROCEDURE — 86850 RBC ANTIBODY SCREEN: CPT

## 2019-02-06 PROCEDURE — 80053 COMPREHEN METABOLIC PANEL: CPT

## 2019-02-06 PROCEDURE — 85730 THROMBOPLASTIN TIME PARTIAL: CPT

## 2019-02-06 PROCEDURE — 74177 CT ABD & PELVIS W/CONTRAST: CPT

## 2019-02-06 PROCEDURE — 96374 THER/PROPH/DIAG INJ IV PUSH: CPT | Mod: XU

## 2019-02-06 PROCEDURE — 85027 COMPLETE CBC AUTOMATED: CPT

## 2019-02-06 PROCEDURE — 83690 ASSAY OF LIPASE: CPT

## 2019-02-06 PROCEDURE — 99284 EMERGENCY DEPT VISIT MOD MDM: CPT | Mod: 25

## 2019-02-08 ENCOUNTER — OTHER (OUTPATIENT)
Age: 44
End: 2019-02-08

## 2019-02-08 ENCOUNTER — MOBILE ON CALL (OUTPATIENT)
Age: 44
End: 2019-02-08

## 2019-02-08 LAB — CYTOLOGY CVX/VAG DOC THIN PREP: NORMAL

## 2019-02-16 RX ADMIN — ONDANSETRON 4 MILLIGRAM(S): 8 TABLET, FILM COATED ORAL at 14:26

## 2019-04-23 ENCOUNTER — APPOINTMENT (OUTPATIENT)
Dept: OBGYN | Facility: CLINIC | Age: 44
End: 2019-04-23
Payer: COMMERCIAL

## 2019-04-23 VITALS
BODY MASS INDEX: 30.46 KG/M2 | DIASTOLIC BLOOD PRESSURE: 67 MMHG | SYSTOLIC BLOOD PRESSURE: 106 MMHG | HEIGHT: 63 IN | HEART RATE: 67 BPM | WEIGHT: 171.9 LBS

## 2019-04-23 PROCEDURE — 99213 OFFICE O/P EST LOW 20 MIN: CPT

## 2019-05-06 ENCOUNTER — APPOINTMENT (OUTPATIENT)
Dept: INTERNAL MEDICINE | Facility: CLINIC | Age: 44
End: 2019-05-06
Payer: COMMERCIAL

## 2019-05-06 VITALS — BODY MASS INDEX: 30.12 KG/M2 | WEIGHT: 170 LBS | HEIGHT: 63 IN

## 2019-05-06 PROCEDURE — 99214 OFFICE O/P EST MOD 30 MIN: CPT | Mod: 25

## 2019-05-06 PROCEDURE — 36415 COLL VENOUS BLD VENIPUNCTURE: CPT

## 2019-05-06 NOTE — PHYSICAL EXAM
[No Acute Distress] : no acute distress [Well Nourished] : well nourished [Clear to Auscultation] : lungs were clear to auscultation bilaterally [No Respiratory Distress] : no respiratory distress  [Regular Rhythm] : with a regular rhythm [No Edema] : there was no peripheral edema [Normal Rate] : normal rate  [No Extremity Clubbing/Cyanosis] : no extremity clubbing/cyanosis

## 2019-05-06 NOTE — HISTORY OF PRESENT ILLNESS
[FreeTextEntry1] : anemia [de-identified] : anemia\par heavy period now better with new bcp\par saw Dr Castelan\par 2 infusions of iron\par moving to [place]\par s/p gastric sleave\par acid reflux better on aciphex\par neck hurt\par has thyroid issue\par

## 2019-05-06 NOTE — REVIEW OF SYSTEMS
[Fever] : no fever [Night Sweats] : no night sweats [Chest Pain] : no chest pain [Dysuria] : no dysuria [Orthopnea] : no orthopnea [Joint Pain] : no joint pain

## 2019-05-08 LAB
ALBUMIN SERPL ELPH-MCNC: 4.5 G/DL
ALP BLD-CCNC: 89 U/L
ALT SERPL-CCNC: 12 U/L
ANION GAP SERPL CALC-SCNC: 13 MMOL/L
AST SERPL-CCNC: 13 U/L
BASOPHILS # BLD AUTO: 0.04 K/UL
BASOPHILS NFR BLD AUTO: 0.6 %
BILIRUB SERPL-MCNC: 0.2 MG/DL
BUN SERPL-MCNC: 12 MG/DL
CALCIUM SERPL-MCNC: 9.6 MG/DL
CHLORIDE SERPL-SCNC: 105 MMOL/L
CO2 SERPL-SCNC: 25 MMOL/L
CREAT SERPL-MCNC: 0.63 MG/DL
EOSINOPHIL # BLD AUTO: 0.06 K/UL
EOSINOPHIL NFR BLD AUTO: 0.9 %
FERRITIN SERPL-MCNC: 37 NG/ML
FOLATE SERPL-MCNC: 10.5 NG/ML
GLUCOSE SERPL-MCNC: 88 MG/DL
HCT VFR BLD CALC: 42.2 %
HGB BLD-MCNC: 13.3 G/DL
IMM GRANULOCYTES NFR BLD AUTO: 0.3 %
IRON SATN MFR SERPL: 12 %
IRON SERPL-MCNC: 41 UG/DL
LYMPHOCYTES # BLD AUTO: 2.03 K/UL
LYMPHOCYTES NFR BLD AUTO: 29.8 %
MAN DIFF?: NORMAL
MCHC RBC-ENTMCNC: 28 PG
MCHC RBC-ENTMCNC: 31.5 GM/DL
MCV RBC AUTO: 88.8 FL
MONOCYTES # BLD AUTO: 0.69 K/UL
MONOCYTES NFR BLD AUTO: 10.1 %
NEUTROPHILS # BLD AUTO: 3.97 K/UL
NEUTROPHILS NFR BLD AUTO: 58.3 %
PLATELET # BLD AUTO: 275 K/UL
POTASSIUM SERPL-SCNC: 4.2 MMOL/L
PROT SERPL-MCNC: 7 G/DL
RBC # BLD: 4.75 M/UL
RBC # FLD: NORMAL
SODIUM SERPL-SCNC: 143 MMOL/L
T4 FREE SERPL-MCNC: 1.1 NG/DL
THYROGLOB AB SERPL-ACNC: 157 IU/ML
THYROPEROXIDASE AB SERPL IA-ACNC: 695 IU/ML
TIBC SERPL-MCNC: 331 UG/DL
TSH SERPL-ACNC: 2.66 UIU/ML
UIBC SERPL-MCNC: 290 UG/DL
VIT B12 SERPL-MCNC: 299 PG/ML
WBC # FLD AUTO: 6.81 K/UL

## 2019-08-09 ENCOUNTER — APPOINTMENT (OUTPATIENT)
Dept: OBGYN | Facility: CLINIC | Age: 44
End: 2019-08-09
Payer: COMMERCIAL

## 2019-08-09 ENCOUNTER — ASOB RESULT (OUTPATIENT)
Age: 44
End: 2019-08-09

## 2019-08-09 PROCEDURE — 76830 TRANSVAGINAL US NON-OB: CPT

## 2019-08-09 PROCEDURE — 76857 US EXAM PELVIC LIMITED: CPT

## 2019-08-23 NOTE — ED ADULT TRIAGE NOTE - ESI TRIAGE ACUITY LEVEL, MLM
Per chart review; rx sent in.   
Pt mother called req refills on pt trazodone rx. Sts they have moved out of state, but their appt with new PCP isn't until October.    [facility]   
3

## 2019-08-27 ENCOUNTER — OTHER (OUTPATIENT)
Age: 44
End: 2019-08-27

## 2019-09-05 ENCOUNTER — APPOINTMENT (OUTPATIENT)
Dept: OBGYN | Facility: CLINIC | Age: 44
End: 2019-09-05
Payer: COMMERCIAL

## 2019-09-05 VITALS
HEART RATE: 74 BPM | WEIGHT: 181.7 LBS | BODY MASS INDEX: 32.2 KG/M2 | SYSTOLIC BLOOD PRESSURE: 116 MMHG | DIASTOLIC BLOOD PRESSURE: 78 MMHG | HEIGHT: 63 IN

## 2019-09-05 DIAGNOSIS — Z01.419 ENCOUNTER FOR GYNECOLOGICAL EXAMINATION (GENERAL) (ROUTINE) W/OUT ABNORMAL FINDINGS: ICD-10-CM

## 2019-09-05 DIAGNOSIS — D50.0 IRON DEFICIENCY ANEMIA SECONDARY TO BLOOD LOSS (CHRONIC): ICD-10-CM

## 2019-09-05 DIAGNOSIS — Z87.39 PERSONAL HISTORY OF OTHER DISEASES OF THE MUSCULOSKELETAL SYSTEM AND CONNECTIVE TISSUE: ICD-10-CM

## 2019-09-05 DIAGNOSIS — Z87.19 PERSONAL HISTORY OF OTHER DISEASES OF THE DIGESTIVE SYSTEM: ICD-10-CM

## 2019-09-05 DIAGNOSIS — R79.89 OTHER SPECIFIED ABNORMAL FINDINGS OF BLOOD CHEMISTRY: ICD-10-CM

## 2019-09-05 DIAGNOSIS — Z86.79 PERSONAL HISTORY OF OTHER DISEASES OF THE CIRCULATORY SYSTEM: ICD-10-CM

## 2019-09-05 DIAGNOSIS — Z87.42 PERSONAL HISTORY OF OTHER DISEASES OF THE FEMALE GENITAL TRACT: ICD-10-CM

## 2019-09-05 DIAGNOSIS — L65.9 NONSCARRING HAIR LOSS, UNSPECIFIED: ICD-10-CM

## 2019-09-05 DIAGNOSIS — Z86.018 PERSONAL HISTORY OF OTHER BENIGN NEOPLASM: ICD-10-CM

## 2019-09-05 DIAGNOSIS — Z87.898 PERSONAL HISTORY OF OTHER SPECIFIED CONDITIONS: ICD-10-CM

## 2019-09-05 DIAGNOSIS — Z87.09 PERSONAL HISTORY OF OTHER DISEASES OF THE RESPIRATORY SYSTEM: ICD-10-CM

## 2019-09-05 DIAGNOSIS — M79.671 PAIN IN RIGHT FOOT: ICD-10-CM

## 2019-09-05 DIAGNOSIS — Z87.448 PERSONAL HISTORY OF OTHER DISEASES OF URINARY SYSTEM: ICD-10-CM

## 2019-09-05 DIAGNOSIS — Z98.84 BARIATRIC SURGERY STATUS: ICD-10-CM

## 2019-09-05 DIAGNOSIS — N70.11 CHRONIC SALPINGITIS: ICD-10-CM

## 2019-09-05 PROCEDURE — 99214 OFFICE O/P EST MOD 30 MIN: CPT

## 2019-09-16 ENCOUNTER — MED ADMIN CHARGE (OUTPATIENT)
Age: 44
End: 2019-09-16

## 2019-09-24 PROBLEM — N70.11 HYDROSALPINX: Status: RESOLVED | Noted: 2017-03-17 | Resolved: 2019-09-24

## 2019-09-24 PROBLEM — Z87.42 HISTORY OF MENORRHAGIA: Status: RESOLVED | Noted: 2019-02-08 | Resolved: 2019-09-24

## 2019-09-24 PROBLEM — Z87.898 HISTORY OF INSOMNIA: Status: RESOLVED | Noted: 2018-11-12 | Resolved: 2019-09-24

## 2019-09-24 PROBLEM — Z87.39 HISTORY OF BACK PAIN: Status: RESOLVED | Noted: 2017-03-27 | Resolved: 2019-09-24

## 2019-09-24 PROBLEM — Z01.419 VISIT FOR ROUTINE GYN EXAM: Status: RESOLVED | Noted: 2019-02-04 | Resolved: 2019-09-24

## 2019-09-24 PROBLEM — Z86.018 HISTORY OF FIBROIDS: Status: RESOLVED | Noted: 2017-03-01 | Resolved: 2019-09-24

## 2019-09-24 PROBLEM — Z98.84 S/P LAPAROSCOPIC SLEEVE GASTRECTOMY: Status: RESOLVED | Noted: 2019-02-04 | Resolved: 2019-09-24

## 2019-09-24 PROBLEM — Z86.79 HISTORY OF SINUS TACHYCARDIA: Status: RESOLVED | Noted: 2018-03-21 | Resolved: 2019-09-24

## 2019-09-24 PROBLEM — R79.89 LOW TSH LEVEL: Status: RESOLVED | Noted: 2018-11-13 | Resolved: 2019-09-24

## 2019-09-24 PROBLEM — Z87.09 HISTORY OF ALLERGIC RHINITIS: Status: RESOLVED | Noted: 2018-07-05 | Resolved: 2019-09-24

## 2019-09-24 PROBLEM — Z87.39 HISTORY OF NECK PAIN: Status: RESOLVED | Noted: 2017-03-27 | Resolved: 2019-09-24

## 2019-10-15 DIAGNOSIS — R92.2 INCONCLUSIVE MAMMOGRAM: ICD-10-CM

## 2019-11-04 ENCOUNTER — APPOINTMENT (OUTPATIENT)
Dept: INTERNAL MEDICINE | Facility: CLINIC | Age: 44
End: 2019-11-04
Payer: COMMERCIAL

## 2019-11-04 VITALS
BODY MASS INDEX: 32.96 KG/M2 | WEIGHT: 186 LBS | HEIGHT: 63 IN | SYSTOLIC BLOOD PRESSURE: 116 MMHG | DIASTOLIC BLOOD PRESSURE: 78 MMHG | HEART RATE: 78 BPM

## 2019-11-04 DIAGNOSIS — E03.9 HYPOTHYROIDISM, UNSPECIFIED: ICD-10-CM

## 2019-11-04 PROCEDURE — 36415 COLL VENOUS BLD VENIPUNCTURE: CPT

## 2019-11-04 PROCEDURE — 99213 OFFICE O/P EST LOW 20 MIN: CPT | Mod: 25

## 2019-11-04 RX ORDER — CHLORHEXIDINE GLUCONATE, 0.12% ORAL RINSE 1.2 MG/ML
0.12 SOLUTION DENTAL
Qty: 473 | Refills: 0 | Status: DISCONTINUED | COMMUNITY
Start: 2019-03-14 | End: 2019-11-04

## 2019-11-04 RX ORDER — AMOXICILLIN 500 MG/1
500 TABLET, FILM COATED ORAL
Qty: 21 | Refills: 0 | Status: DISCONTINUED | COMMUNITY
Start: 2019-03-14 | End: 2019-11-04

## 2019-11-04 RX ORDER — FERROUS SULFATE 220 (44)/5
220 (44 FE) SOLUTION, ORAL ORAL 3 TIMES DAILY
Qty: 1 | Refills: 5 | Status: DISCONTINUED | COMMUNITY
Start: 2019-01-15 | End: 2019-11-04

## 2019-11-04 RX ORDER — TRANEXAMIC ACID 650 MG/1
650 TABLET ORAL
Qty: 60 | Refills: 1 | Status: DISCONTINUED | COMMUNITY
Start: 2019-02-08 | End: 2019-11-04

## 2019-11-04 NOTE — PHYSICAL EXAM
[No Acute Distress] : no acute distress [Normal Outer Ear/Nose] : the outer ears and nose were normal in appearance [Normal Oropharynx] : the oropharynx was normal [No JVD] : no jugular venous distention [No Lymphadenopathy] : no lymphadenopathy [No Respiratory Distress] : no respiratory distress  [No Accessory Muscle Use] : no accessory muscle use [Normal Rate] : normal rate  [Regular Rhythm] : with a regular rhythm [No Edema] : there was no peripheral edema [Soft] : abdomen soft [No HSM] : no HSM

## 2019-11-04 NOTE — HISTORY OF PRESENT ILLNESS
[FreeTextEntry1] : going on cruise [de-identified] : going on cruise\par has gained weight\par had gastric sleeve\par heavy mences has na upcoming hysterectomy\par has gotten iron infusion in past\par

## 2019-11-05 LAB
25(OH)D3 SERPL-MCNC: 28.4 NG/ML
ALBUMIN SERPL ELPH-MCNC: 4.4 G/DL
ALP BLD-CCNC: 90 U/L
ALT SERPL-CCNC: 8 U/L
ANION GAP SERPL CALC-SCNC: 13 MMOL/L
AST SERPL-CCNC: 11 U/L
BASOPHILS # BLD AUTO: 0.04 K/UL
BASOPHILS NFR BLD AUTO: 0.6 %
BILIRUB SERPL-MCNC: 0.2 MG/DL
BUN SERPL-MCNC: 13 MG/DL
CALCIUM SERPL-MCNC: 9.6 MG/DL
CHLORIDE SERPL-SCNC: 106 MMOL/L
CHOLEST SERPL-MCNC: 196 MG/DL
CHOLEST/HDLC SERPL: 3.5 RATIO
CO2 SERPL-SCNC: 23 MMOL/L
CREAT SERPL-MCNC: 0.74 MG/DL
EOSINOPHIL # BLD AUTO: 0.1 K/UL
EOSINOPHIL NFR BLD AUTO: 1.5 %
ESTIMATED AVERAGE GLUCOSE: 103 MG/DL
FERRITIN SERPL-MCNC: 4 NG/ML
FOLATE SERPL-MCNC: 12.7 NG/ML
GLUCOSE SERPL-MCNC: 85 MG/DL
HBA1C MFR BLD HPLC: 5.2 %
HCT VFR BLD CALC: 33.4 %
HDLC SERPL-MCNC: 56 MG/DL
HGB BLD-MCNC: 9.6 G/DL
IMM GRANULOCYTES NFR BLD AUTO: 0.3 %
IRON SATN MFR SERPL: 3 %
IRON SERPL-MCNC: 12 UG/DL
LDLC SERPL CALC-MCNC: 115 MG/DL
LYMPHOCYTES # BLD AUTO: 2.08 K/UL
LYMPHOCYTES NFR BLD AUTO: 31.8 %
MAN DIFF?: NORMAL
MCHC RBC-ENTMCNC: 23.2 PG
MCHC RBC-ENTMCNC: 28.7 GM/DL
MCV RBC AUTO: 80.7 FL
MONOCYTES # BLD AUTO: 0.87 K/UL
MONOCYTES NFR BLD AUTO: 13.3 %
NEUTROPHILS # BLD AUTO: 3.44 K/UL
NEUTROPHILS NFR BLD AUTO: 52.5 %
PLATELET # BLD AUTO: 331 K/UL
POTASSIUM SERPL-SCNC: 4.2 MMOL/L
PROT SERPL-MCNC: 7.2 G/DL
RBC # BLD: 4.14 M/UL
RBC # FLD: 14.8 %
SODIUM SERPL-SCNC: 142 MMOL/L
T4 FREE SERPL-MCNC: 1.2 NG/DL
TIBC SERPL-MCNC: 406 UG/DL
TRIGL SERPL-MCNC: 127 MG/DL
TSH SERPL-ACNC: 1.22 UIU/ML
UIBC SERPL-MCNC: 394 UG/DL
VIT B12 SERPL-MCNC: 347 PG/ML
WBC # FLD AUTO: 6.55 K/UL

## 2019-12-12 ENCOUNTER — APPOINTMENT (OUTPATIENT)
Dept: OBGYN | Facility: CLINIC | Age: 44
End: 2019-12-12
Payer: COMMERCIAL

## 2019-12-12 VITALS
HEIGHT: 63 IN | BODY MASS INDEX: 33.18 KG/M2 | HEART RATE: 77 BPM | SYSTOLIC BLOOD PRESSURE: 118 MMHG | WEIGHT: 187.25 LBS | DIASTOLIC BLOOD PRESSURE: 80 MMHG

## 2019-12-12 DIAGNOSIS — N92.0 EXCESSIVE AND FREQUENT MENSTRUATION WITH REGULAR CYCLE: ICD-10-CM

## 2019-12-12 DIAGNOSIS — N80.9 ENDOMETRIOSIS, UNSPECIFIED: ICD-10-CM

## 2019-12-12 DIAGNOSIS — Z98.890 OTHER SPECIFIED POSTPROCEDURAL STATES: ICD-10-CM

## 2019-12-12 PROCEDURE — 99214 OFFICE O/P EST MOD 30 MIN: CPT

## 2019-12-16 ENCOUNTER — NON-APPOINTMENT (OUTPATIENT)
Age: 44
End: 2019-12-16

## 2019-12-16 ENCOUNTER — APPOINTMENT (OUTPATIENT)
Dept: INTERNAL MEDICINE | Facility: CLINIC | Age: 44
End: 2019-12-16
Payer: COMMERCIAL

## 2019-12-16 VITALS
HEIGHT: 63 IN | WEIGHT: 187 LBS | HEART RATE: 78 BPM | SYSTOLIC BLOOD PRESSURE: 120 MMHG | BODY MASS INDEX: 33.13 KG/M2 | DIASTOLIC BLOOD PRESSURE: 78 MMHG

## 2019-12-16 VITALS — OXYGEN SATURATION: 98 %

## 2019-12-16 DIAGNOSIS — D50.0 IRON DEFICIENCY ANEMIA SECONDARY TO BLOOD LOSS (CHRONIC): ICD-10-CM

## 2019-12-16 PROCEDURE — 93000 ELECTROCARDIOGRAM COMPLETE: CPT

## 2019-12-16 PROCEDURE — 99214 OFFICE O/P EST MOD 30 MIN: CPT | Mod: 25

## 2019-12-16 PROCEDURE — 36415 COLL VENOUS BLD VENIPUNCTURE: CPT

## 2019-12-16 RX ORDER — SCOPOLAMINE 1.5 MG/1
1 PATCH, EXTENDED RELEASE TRANSDERMAL
Qty: 1 | Refills: 1 | Status: DISCONTINUED | COMMUNITY
Start: 2019-11-04 | End: 2019-12-16

## 2019-12-16 NOTE — PHYSICAL EXAM
[No Acute Distress] : no acute distress [Well Nourished] : well nourished [Normal Outer Ear/Nose] : the outer ears and nose were normal in appearance [Normal Oropharynx] : the oropharynx was normal [No Lymphadenopathy] : no lymphadenopathy [No JVD] : no jugular venous distention [No Accessory Muscle Use] : no accessory muscle use [Normal Rate] : normal rate  [No Respiratory Distress] : no respiratory distress  [Regular Rhythm] : with a regular rhythm

## 2019-12-17 LAB
ALBUMIN SERPL ELPH-MCNC: 4.4 G/DL
ALP BLD-CCNC: 80 U/L
ALT SERPL-CCNC: 8 U/L
ANION GAP SERPL CALC-SCNC: 13 MMOL/L
AST SERPL-CCNC: 14 U/L
BASOPHILS # BLD AUTO: 0.03 K/UL
BASOPHILS NFR BLD AUTO: 0.5 %
BILIRUB SERPL-MCNC: <0.2 MG/DL
BUN SERPL-MCNC: 14 MG/DL
CALCIUM SERPL-MCNC: 9.3 MG/DL
CHLORIDE SERPL-SCNC: 104 MMOL/L
CO2 SERPL-SCNC: 23 MMOL/L
CREAT SERPL-MCNC: 0.69 MG/DL
EOSINOPHIL # BLD AUTO: 0.04 K/UL
EOSINOPHIL NFR BLD AUTO: 0.7 %
GLUCOSE SERPL-MCNC: 90 MG/DL
HCT VFR BLD CALC: 26.5 %
HGB BLD-MCNC: 7.6 G/DL
IMM GRANULOCYTES NFR BLD AUTO: 0.3 %
INR PPP: 0.91 RATIO
LYMPHOCYTES # BLD AUTO: 1.99 K/UL
LYMPHOCYTES NFR BLD AUTO: 34.5 %
MAN DIFF?: NORMAL
MCHC RBC-ENTMCNC: 21.5 PG
MCHC RBC-ENTMCNC: 28.7 GM/DL
MCV RBC AUTO: 75.1 FL
MONOCYTES # BLD AUTO: 0.76 K/UL
MONOCYTES NFR BLD AUTO: 13.2 %
NEUTROPHILS # BLD AUTO: 2.92 K/UL
NEUTROPHILS NFR BLD AUTO: 50.8 %
PLATELET # BLD AUTO: 371 K/UL
POTASSIUM SERPL-SCNC: 4 MMOL/L
PROT SERPL-MCNC: 6.7 G/DL
PT BLD: 10.2 SEC
RBC # BLD: 3.53 M/UL
RBC # FLD: 15.5 %
SODIUM SERPL-SCNC: 140 MMOL/L
WBC # FLD AUTO: 5.76 K/UL

## 2019-12-26 ENCOUNTER — OUTPATIENT (OUTPATIENT)
Dept: OUTPATIENT SERVICES | Facility: HOSPITAL | Age: 44
LOS: 1 days | End: 2019-12-26
Payer: COMMERCIAL

## 2019-12-26 VITALS
WEIGHT: 184.09 LBS | DIASTOLIC BLOOD PRESSURE: 62 MMHG | HEART RATE: 73 BPM | RESPIRATION RATE: 15 BRPM | OXYGEN SATURATION: 100 % | SYSTOLIC BLOOD PRESSURE: 105 MMHG | TEMPERATURE: 99 F | HEIGHT: 63 IN

## 2019-12-26 DIAGNOSIS — Z98.890 OTHER SPECIFIED POSTPROCEDURAL STATES: Chronic | ICD-10-CM

## 2019-12-26 DIAGNOSIS — Z98.891 HISTORY OF UTERINE SCAR FROM PREVIOUS SURGERY: Chronic | ICD-10-CM

## 2019-12-26 DIAGNOSIS — N92.0 EXCESSIVE AND FREQUENT MENSTRUATION WITH REGULAR CYCLE: ICD-10-CM

## 2019-12-26 DIAGNOSIS — D64.9 ANEMIA, UNSPECIFIED: ICD-10-CM

## 2019-12-26 DIAGNOSIS — Z98.51 TUBAL LIGATION STATUS: Chronic | ICD-10-CM

## 2019-12-26 DIAGNOSIS — D21.9 BENIGN NEOPLASM OF CONNECTIVE AND OTHER SOFT TISSUE, UNSPECIFIED: ICD-10-CM

## 2019-12-26 DIAGNOSIS — Z98.84 BARIATRIC SURGERY STATUS: Chronic | ICD-10-CM

## 2019-12-26 DIAGNOSIS — Z90.49 ACQUIRED ABSENCE OF OTHER SPECIFIED PARTS OF DIGESTIVE TRACT: Chronic | ICD-10-CM

## 2019-12-26 DIAGNOSIS — Z01.818 ENCOUNTER FOR OTHER PREPROCEDURAL EXAMINATION: ICD-10-CM

## 2019-12-26 DIAGNOSIS — N92.1 EXCESSIVE AND FREQUENT MENSTRUATION WITH IRREGULAR CYCLE: ICD-10-CM

## 2019-12-26 DIAGNOSIS — N80.9 ENDOMETRIOSIS, UNSPECIFIED: ICD-10-CM

## 2019-12-26 LAB
ANION GAP SERPL CALC-SCNC: 11 MMOL/L — SIGNIFICANT CHANGE UP (ref 5–17)
BLD GP AB SCN SERPL QL: NEGATIVE — SIGNIFICANT CHANGE UP
BUN SERPL-MCNC: 8 MG/DL — SIGNIFICANT CHANGE UP (ref 7–23)
CALCIUM SERPL-MCNC: 9.4 MG/DL — SIGNIFICANT CHANGE UP (ref 8.4–10.5)
CHLORIDE SERPL-SCNC: 104 MMOL/L — SIGNIFICANT CHANGE UP (ref 96–108)
CO2 SERPL-SCNC: 25 MMOL/L — SIGNIFICANT CHANGE UP (ref 22–31)
CREAT SERPL-MCNC: 0.66 MG/DL — SIGNIFICANT CHANGE UP (ref 0.5–1.3)
GLUCOSE SERPL-MCNC: 95 MG/DL — SIGNIFICANT CHANGE UP (ref 70–99)
HBA1C BLD-MCNC: 5.3 % — SIGNIFICANT CHANGE UP (ref 4–5.6)
HCT VFR BLD CALC: 26.8 % — LOW (ref 34.5–45)
HGB BLD-MCNC: 7.8 G/DL — LOW (ref 11.5–15.5)
MCHC RBC-ENTMCNC: 21.1 PG — LOW (ref 27–34)
MCHC RBC-ENTMCNC: 29.1 GM/DL — LOW (ref 32–36)
MCV RBC AUTO: 72.6 FL — LOW (ref 80–100)
PLATELET # BLD AUTO: 250 K/UL — SIGNIFICANT CHANGE UP (ref 150–400)
POTASSIUM SERPL-MCNC: 3.9 MMOL/L — SIGNIFICANT CHANGE UP (ref 3.5–5.3)
POTASSIUM SERPL-SCNC: 3.9 MMOL/L — SIGNIFICANT CHANGE UP (ref 3.5–5.3)
RBC # BLD: 3.69 M/UL — LOW (ref 3.8–5.2)
RBC # FLD: 15.8 % — HIGH (ref 10.3–14.5)
RH IG SCN BLD-IMP: POSITIVE — SIGNIFICANT CHANGE UP
SODIUM SERPL-SCNC: 140 MMOL/L — SIGNIFICANT CHANGE UP (ref 135–145)
WBC # BLD: 5.51 K/UL — SIGNIFICANT CHANGE UP (ref 3.8–10.5)
WBC # FLD AUTO: 5.51 K/UL — SIGNIFICANT CHANGE UP (ref 3.8–10.5)

## 2019-12-26 PROCEDURE — G0463: CPT

## 2019-12-26 PROCEDURE — 86900 BLOOD TYPING SEROLOGIC ABO: CPT

## 2019-12-26 PROCEDURE — 83036 HEMOGLOBIN GLYCOSYLATED A1C: CPT

## 2019-12-26 PROCEDURE — 86850 RBC ANTIBODY SCREEN: CPT

## 2019-12-26 PROCEDURE — 80048 BASIC METABOLIC PNL TOTAL CA: CPT

## 2019-12-26 PROCEDURE — 86901 BLOOD TYPING SEROLOGIC RH(D): CPT

## 2019-12-26 PROCEDURE — 85027 COMPLETE CBC AUTOMATED: CPT

## 2019-12-26 PROCEDURE — 87086 URINE CULTURE/COLONY COUNT: CPT

## 2019-12-26 RX ORDER — CEFOTETAN DISODIUM 1 G
2 VIAL (EA) INJECTION ONCE
Refills: 0 | Status: DISCONTINUED | OUTPATIENT
Start: 2019-12-30 | End: 2020-02-02

## 2019-12-26 NOTE — H&P PST ADULT - NSICDXPASTSURGICALHX_GEN_ALL_CORE_FT
PAST SURGICAL HISTORY:  H/O ovarian cystectomy     H/O tubal ligation ~13 years ago    History of appendectomy 2006    S/P  section x 2  (, )    S/P laparoscopic sleeve gastrectomy 2017 PAST SURGICAL HISTORY:  H/O ovarian cystectomy     H/O tubal ligation bilaterally ~13 years ago    History of appendectomy 2006    S/P  section x 2  (, )    S/P laparoscopic sleeve gastrectomy 2017

## 2019-12-26 NOTE — H&P PST ADULT - ACTIVITY
pt is very active, housework, elliptical, able to participate all strenuous exercise without distress

## 2019-12-26 NOTE — H&P PST ADULT - NSICDXPASTMEDICALHX_GEN_ALL_CORE_FT
PAST MEDICAL HISTORY:  Abscess of right leg history - resolved    Anemia received Iron infusions    Asthma last exacerbation ~5 years ago - not on medication    Cyst of ovary s/p removal    GERD (gastroesophageal reflux disease)     Morbid (severe) obesity due to excess calories PAST MEDICAL HISTORY:  Abscess of right leg history - resolved    Anemia received Iron infusions, last 12/23/19    Asthma last exacerbation ~5 years ago - not on medication    Cyst of ovary s/p removal    GERD (gastroesophageal reflux disease)     Morbid (severe) obesity due to excess calories

## 2019-12-26 NOTE — H&P PST ADULT - PRIMARY CARE PROVIDER
Anish Jackson ([facility]) [phone] Anish Jackson ([facility]) [phone]

## 2019-12-26 NOTE — H&P PST ADULT - MUSCULOSKELETAL
details… detailed exam no joint erythema/normal/ROM intact/no joint swelling no calf tenderness/normal strength/ROM intact

## 2019-12-26 NOTE — H&P PST ADULT - NSICDXPROBLEM_GEN_ALL_CORE_FT
PROBLEM DIAGNOSES  Problem: Menorrhagia  Assessment and Plan: PROBLEM DIAGNOSES  Problem: Menorrhagia  Assessment and Plan: Scheduled total laparoscopic hysterectomy, bilateral salpingectomy, cystoscopy on 12/30/2019   Pre-op instructions given to pt, chlorhexidine soap provided  Lab work sent at PST    Problem: Anemia  Assessment and Plan: Patient received Iron infusion 12/23/19, CBC sent at PST PROBLEM DIAGNOSES  Problem: Menorrhagia  Assessment and Plan: Scheduled total laparoscopic hysterectomy, bilateral salpingectomy, cystoscopy on 12/30/2019   Pre-op instructions given to pt, chlorhexidine soap provided  Lab work sent at [facility]  Per Dr. Jackson's note (in chart), pt medically cleared for surgery if Hgb >8.0    Problem: Anemia  Assessment and Plan: Patient received Iron infusion 12/23/19, CBC sent at PST

## 2019-12-26 NOTE — H&P PST ADULT - RS GEN PE MLT RESP DETAILS PC
respirations non-labored/airway patent/clear to auscultation bilaterally/good air movement/normal/breath sounds equal no rales/respirations non-labored/no rhonchi/no wheezes/clear to auscultation bilaterally

## 2019-12-26 NOTE — H&P PST ADULT - NSANTHOSAYNRD_GEN_A_CORE
No. ZINA screening performed.  STOP BANG Legend: 0-2 = LOW Risk; 3-4 = INTERMEDIATE Risk; 5-8 = HIGH Risk/14 3/4"

## 2019-12-27 LAB
CULTURE RESULTS: SIGNIFICANT CHANGE UP
SPECIMEN SOURCE: SIGNIFICANT CHANGE UP

## 2019-12-27 NOTE — PLAN
[FreeTextEntry1] : Pre op exam\par having mences\par to check blood count\par \par \par HGB 7.6 Should receive iron infusion and need f/u CBC\par EKG OK\par \par Addendum\par \par Patient received iron infusion 4 days ago and  1 day ago hgb 7.8 and is probably on the way up\par \par Patient is medically cleared for surgery with Hgb 7.8\par

## 2019-12-27 NOTE — REVIEW OF SYSTEMS
[Fever] : no fever [Night Sweats] : no night sweats [Chest Pain] : no chest pain [Earache] : no earache [Vomiting] : no vomiting [Abdominal Pain] : no abdominal pain [Joint Pain] : no joint pain

## 2019-12-27 NOTE — HISTORY OF PRESENT ILLNESS
[No Pertinent Cardiac History] : no history of aortic stenosis, atrial fibrillation, coronary artery disease, recent myocardial infarction, or implantable device/pacemaker [No Adverse Anesthesia Reaction] : no adverse anesthesia reaction in self or family member [Aortic Stenosis] : no aortic stenosis [Atrial Fibrillation] : no atrial fibrillation [Coronary Artery Disease] : no coronary artery disease [Recent Myocardial Infarction] : no recent myocardial infarction [Implantable Device/Pacemaker] : no implantable device/pacemaker [Asthma] : no asthma [COPD] : no COPD [Sleep Apnea] : no sleep apnea [Smoker] : not a smoker [Chronic Anticoagulation] : no chronic anticoagulation [Diabetes] : no diabetes [Chronic Kidney Disease] : no chronic kidney disease [FreeTextEntry1] : Hysterectomy [FreeTextEntry3] : Dr Howard [FreeTextEntry2] : Dec 30 [FreeTextEntry4] : Partial hysterectomy for menorrhagia and anemia\par otherwise well\par intolerant of iron\par history of gastric sleeve\par gerd controlled by acihex

## 2019-12-29 ENCOUNTER — TRANSCRIPTION ENCOUNTER (OUTPATIENT)
Age: 44
End: 2019-12-29

## 2019-12-30 ENCOUNTER — OUTPATIENT (OUTPATIENT)
Dept: OUTPATIENT SERVICES | Facility: HOSPITAL | Age: 44
LOS: 1 days | End: 2019-12-30
Payer: COMMERCIAL

## 2019-12-30 ENCOUNTER — APPOINTMENT (OUTPATIENT)
Dept: OBGYN | Facility: HOSPITAL | Age: 44
End: 2019-12-30

## 2019-12-30 ENCOUNTER — RESULT REVIEW (OUTPATIENT)
Age: 44
End: 2019-12-30

## 2019-12-30 VITALS
SYSTOLIC BLOOD PRESSURE: 97 MMHG | HEART RATE: 87 BPM | TEMPERATURE: 98 F | OXYGEN SATURATION: 100 % | RESPIRATION RATE: 18 BRPM | DIASTOLIC BLOOD PRESSURE: 65 MMHG

## 2019-12-30 VITALS
HEART RATE: 79 BPM | TEMPERATURE: 98 F | SYSTOLIC BLOOD PRESSURE: 103 MMHG | RESPIRATION RATE: 18 BRPM | OXYGEN SATURATION: 100 % | WEIGHT: 184.09 LBS | HEIGHT: 63 IN | DIASTOLIC BLOOD PRESSURE: 70 MMHG

## 2019-12-30 DIAGNOSIS — N92.1 EXCESSIVE AND FREQUENT MENSTRUATION WITH IRREGULAR CYCLE: ICD-10-CM

## 2019-12-30 DIAGNOSIS — N80.9 ENDOMETRIOSIS, UNSPECIFIED: ICD-10-CM

## 2019-12-30 DIAGNOSIS — Z98.891 HISTORY OF UTERINE SCAR FROM PREVIOUS SURGERY: Chronic | ICD-10-CM

## 2019-12-30 DIAGNOSIS — Z98.890 OTHER SPECIFIED POSTPROCEDURAL STATES: Chronic | ICD-10-CM

## 2019-12-30 DIAGNOSIS — Z98.84 BARIATRIC SURGERY STATUS: Chronic | ICD-10-CM

## 2019-12-30 DIAGNOSIS — D21.9 BENIGN NEOPLASM OF CONNECTIVE AND OTHER SOFT TISSUE, UNSPECIFIED: ICD-10-CM

## 2019-12-30 DIAGNOSIS — Z90.49 ACQUIRED ABSENCE OF OTHER SPECIFIED PARTS OF DIGESTIVE TRACT: Chronic | ICD-10-CM

## 2019-12-30 DIAGNOSIS — Z98.51 TUBAL LIGATION STATUS: Chronic | ICD-10-CM

## 2019-12-30 LAB
GAS PNL BLDA: SIGNIFICANT CHANGE UP
GLUCOSE BLDC GLUCOMTR-MCNC: 95 MG/DL — SIGNIFICANT CHANGE UP (ref 70–99)

## 2019-12-30 PROCEDURE — C1889: CPT

## 2019-12-30 PROCEDURE — 88342 IMHCHEM/IMCYTCHM 1ST ANTB: CPT

## 2019-12-30 PROCEDURE — 82947 ASSAY GLUCOSE BLOOD QUANT: CPT

## 2019-12-30 PROCEDURE — 88342 IMHCHEM/IMCYTCHM 1ST ANTB: CPT | Mod: 26

## 2019-12-30 PROCEDURE — 88341 IMHCHEM/IMCYTCHM EA ADD ANTB: CPT | Mod: 26

## 2019-12-30 PROCEDURE — 85014 HEMATOCRIT: CPT

## 2019-12-30 PROCEDURE — 86923 COMPATIBILITY TEST ELECTRIC: CPT

## 2019-12-30 PROCEDURE — 88341 IMHCHEM/IMCYTCHM EA ADD ANTB: CPT

## 2019-12-30 PROCEDURE — 58573 TLH W/T/O UTERUS OVER 250 G: CPT

## 2019-12-30 PROCEDURE — 82962 GLUCOSE BLOOD TEST: CPT

## 2019-12-30 PROCEDURE — 83605 ASSAY OF LACTIC ACID: CPT

## 2019-12-30 PROCEDURE — 88307 TISSUE EXAM BY PATHOLOGIST: CPT | Mod: 26

## 2019-12-30 PROCEDURE — 52204 CYSTOSCOPY W/BIOPSY(S): CPT

## 2019-12-30 PROCEDURE — 84295 ASSAY OF SERUM SODIUM: CPT

## 2019-12-30 PROCEDURE — 88307 TISSUE EXAM BY PATHOLOGIST: CPT

## 2019-12-30 PROCEDURE — 82330 ASSAY OF CALCIUM: CPT

## 2019-12-30 PROCEDURE — 82565 ASSAY OF CREATININE: CPT

## 2019-12-30 PROCEDURE — 82803 BLOOD GASES ANY COMBINATION: CPT

## 2019-12-30 PROCEDURE — 84132 ASSAY OF SERUM POTASSIUM: CPT

## 2019-12-30 PROCEDURE — 82435 ASSAY OF BLOOD CHLORIDE: CPT

## 2019-12-30 RX ORDER — ONDANSETRON 8 MG/1
4 TABLET, FILM COATED ORAL EVERY 6 HOURS
Refills: 0 | Status: DISCONTINUED | OUTPATIENT
Start: 2019-12-30 | End: 2019-12-30

## 2019-12-30 RX ORDER — CELECOXIB 200 MG/1
200 CAPSULE ORAL ONCE
Refills: 0 | Status: COMPLETED | OUTPATIENT
Start: 2019-12-30 | End: 2019-12-30

## 2019-12-30 RX ORDER — LIDOCAINE HCL 20 MG/ML
0.2 VIAL (ML) INJECTION ONCE
Refills: 0 | Status: DISCONTINUED | OUTPATIENT
Start: 2019-12-30 | End: 2019-12-30

## 2019-12-30 RX ORDER — CHLORHEXIDINE GLUCONATE 213 G/1000ML
1 SOLUTION TOPICAL ONCE
Refills: 0 | Status: DISCONTINUED | OUTPATIENT
Start: 2019-12-30 | End: 2019-12-30

## 2019-12-30 RX ORDER — FAMOTIDINE 10 MG/ML
20 INJECTION INTRAVENOUS ONCE
Refills: 0 | Status: COMPLETED | OUTPATIENT
Start: 2019-12-30 | End: 2019-12-30

## 2019-12-30 RX ORDER — OXYCODONE HYDROCHLORIDE 5 MG/1
5 TABLET ORAL EVERY 4 HOURS
Refills: 0 | Status: DISCONTINUED | OUTPATIENT
Start: 2019-12-30 | End: 2019-12-30

## 2019-12-30 RX ORDER — ACETAMINOPHEN 500 MG
975 TABLET ORAL ONCE
Refills: 0 | Status: COMPLETED | OUTPATIENT
Start: 2019-12-30 | End: 2019-12-30

## 2019-12-30 RX ORDER — LEVOTHYROXINE SODIUM 125 MCG
1 TABLET ORAL
Qty: 0 | Refills: 0 | DISCHARGE

## 2019-12-30 RX ORDER — OXYCODONE HYDROCHLORIDE 5 MG/1
1 TABLET ORAL
Qty: 10 | Refills: 0
Start: 2019-12-30

## 2019-12-30 RX ORDER — SODIUM CHLORIDE 9 MG/ML
3 INJECTION INTRAMUSCULAR; INTRAVENOUS; SUBCUTANEOUS EVERY 8 HOURS
Refills: 0 | Status: DISCONTINUED | OUTPATIENT
Start: 2019-12-30 | End: 2019-12-30

## 2019-12-30 RX ORDER — ACETAMINOPHEN 500 MG
975 TABLET ORAL EVERY 6 HOURS
Refills: 0 | Status: DISCONTINUED | OUTPATIENT
Start: 2019-12-30 | End: 2020-02-02

## 2019-12-30 RX ORDER — IBUPROFEN 200 MG
1 TABLET ORAL
Qty: 0 | Refills: 0 | DISCHARGE
Start: 2019-12-30

## 2019-12-30 RX ORDER — FENTANYL CITRATE 50 UG/ML
25 INJECTION INTRAVENOUS
Refills: 0 | Status: DISCONTINUED | OUTPATIENT
Start: 2019-12-30 | End: 2019-12-30

## 2019-12-30 RX ORDER — ACETAMINOPHEN 500 MG
3 TABLET ORAL
Qty: 0 | Refills: 0 | DISCHARGE
Start: 2019-12-30

## 2019-12-30 RX ORDER — IBUPROFEN 200 MG
600 TABLET ORAL EVERY 6 HOURS
Refills: 0 | Status: DISCONTINUED | OUTPATIENT
Start: 2019-12-30 | End: 2020-02-02

## 2019-12-30 RX ORDER — SODIUM CHLORIDE 9 MG/ML
1000 INJECTION, SOLUTION INTRAVENOUS
Refills: 0 | Status: DISCONTINUED | OUTPATIENT
Start: 2019-12-30 | End: 2019-12-30

## 2019-12-30 RX ORDER — RABEPRAZOLE 20 MG/1
1 TABLET, DELAYED RELEASE ORAL
Qty: 0 | Refills: 0 | DISCHARGE

## 2019-12-30 RX ADMIN — CELECOXIB 200 MILLIGRAM(S): 200 CAPSULE ORAL at 07:04

## 2019-12-30 RX ADMIN — FENTANYL CITRATE 25 MICROGRAM(S): 50 INJECTION INTRAVENOUS at 12:46

## 2019-12-30 RX ADMIN — FENTANYL CITRATE 25 MICROGRAM(S): 50 INJECTION INTRAVENOUS at 12:59

## 2019-12-30 RX ADMIN — SODIUM CHLORIDE 125 MILLILITER(S): 9 INJECTION, SOLUTION INTRAVENOUS at 11:58

## 2019-12-30 RX ADMIN — Medication 600 MILLIGRAM(S): at 12:58

## 2019-12-30 RX ADMIN — Medication 975 MILLIGRAM(S): at 13:03

## 2019-12-30 RX ADMIN — ONDANSETRON 4 MILLIGRAM(S): 8 TABLET, FILM COATED ORAL at 11:58

## 2019-12-30 RX ADMIN — Medication 975 MILLIGRAM(S): at 14:00

## 2019-12-30 RX ADMIN — FENTANYL CITRATE 25 MICROGRAM(S): 50 INJECTION INTRAVENOUS at 13:03

## 2019-12-30 RX ADMIN — Medication 600 MILLIGRAM(S): at 12:25

## 2019-12-30 NOTE — PROGRESS NOTE ADULT - SUBJECTIVE AND OBJECTIVE BOX
PA POST-OP CHECK    No Known Allergies    S: Pt awake and alert in bed presently receiving analgesic for pain relief.  Pain controlled. Pt denies N/V, SOB, CP, palpitations.    O:   T(C): 36.3 (19 @ 11:20), Max: 36.3 (19 @ 11:20)  HR: 93 (19 @ 14:30) (85 - 103)  BP: 105/63 (19 @ 14:30) (95/53 - 105/63)  RR: 14 (19 @ 14:30) (12 - )  SpO2: 100% (19 @ 14:30) (98% - 100%)    I&O's Summary    30 Dec 2019 07:01  -  30 Dec 2019 14:37  --------------------------------------------------------  IN: 425 mL / OUT: 0 mL / NET: 425 mL    Heart: S1S2, RRR  Lungs: CTA B/L  Abd: soft, appropriately tender, occassional BS x 4 quadrants  Inc: Clean/dry/intact  Pelvic:  (-)  bleeding  Ext: PAS in place, Neg Homans B/L    A/P: 44y Female S/P [place], Cystoscopy, TLH-BS in stable condition  with PMHx of   PAST MEDICAL & SURGICAL HISTORY:  GERD (gastroesophageal reflux disease)  Morbid (severe) obesity due to excess calories  Abscess of right leg: history - resolved  Cyst of ovary: s/p removal  Anemia: received Iron infusions, last 19  Asthma: last exacerbation ~5 years ago - not on medication  H/O tubal ligation: bilaterally ~13 years ago  S/P laparoscopic sleeve gastrectomy: 2017  H/O ovarian cystectomy  S/P  section: x 2  (, )  History of appendectomy:     -Continue post-op care  -Analgesia in PACU & prn  -OOB with assistance x 2, then Ad Isa  -Meds:   acetaminophen   Tablet .. 975 milliGRAM(s) Oral every 6 hours  cefoTEtan  IVPB 2 Gram(s) IV Intermittent once  fentaNYL    Injectable 25 MICROGram(s) IV Push every 5 minutes PRN  ibuprofen  Tablet. 600 milliGRAM(s) Oral every 6 hours  lactated ringers. 1000 milliLiter(s) IV Continuous <Continuous>  ondansetron Injectable 4 milliGRAM(s) IV Push every 6 hours PRN  oxyCODONE    IR 5 milliGRAM(s) Oral every 4 hours PRN    -IVFluids- LR  -Diet-  Advance as Tolerated to Reg  - Due to void  -PAS   -D/C home pending meeting PACU criteria    J CARLOS Diana

## 2019-12-30 NOTE — ASU DISCHARGE PLAN (ADULT/PEDIATRIC) - CALL YOUR DOCTOR IF YOU HAVE ANY OF THE FOLLOWING:
Pain not relieved by Medications/Bleeding that does not stop/Nausea and vomiting that does not stop/Unable to urinate/Inability to tolerate liquids or foods/Fever greater than (need to indicate Fahrenheit or Celsius)/Wound/Surgical Site with redness, or foul smelling discharge or pus

## 2019-12-30 NOTE — ASU PREOP CHECKLIST - 1.
Emotional support and pre-op teaching provided to patient.  Emotional support and pre-op teaching provided to patient.

## 2019-12-30 NOTE — ASU PATIENT PROFILE, ADULT - PSH
H/O ovarian cystectomy    H/O tubal ligation  bilaterally ~13 years ago  History of appendectomy  2006  S/P  section  x 2  (, )  S/P laparoscopic sleeve gastrectomy  2017

## 2019-12-30 NOTE — PRE-ANESTHESIA EVALUATION ADULT - NSANTHOSAYNRD_GEN_A_CORE
14 3/4"/No. ZINA screening performed.  STOP BANG Legend: 0-2 = LOW Risk; 3-4 = INTERMEDIATE Risk; 5-8 = HIGH Risk

## 2019-12-30 NOTE — BRIEF OPERATIVE NOTE - NSICDXBRIEFPREOP_GEN_ALL_CORE_FT
PRE-OP DIAGNOSIS:  Endometriosis 30-Dec-2019 11:06:12  Jen Bethea  Abnormal uterine bleeding 30-Dec-2019 11:05:59  Jen Bethea

## 2019-12-30 NOTE — BRIEF OPERATIVE NOTE - OPERATION/FINDINGS
12/14 wk enlarged uterus with adhesions of omentum to anterior uterus and adhesions of anterior uterus to abdominal wall. filmy adhesions of bowel to posterior uterus  normal ovaries bilaterally  normal cystoscopy at end of procedure with intact bladder and jets from bilateral UOs

## 2019-12-30 NOTE — ASU DISCHARGE PLAN (ADULT/PEDIATRIC) - ACTIVITY LEVEL
No sports/gym/No douching/No intercourse/No excercise/No heavy lifting/No tampons/Nothing per vagina/No tub baths

## 2019-12-30 NOTE — ASU DISCHARGE PLAN (ADULT/PEDIATRIC) - WORK/SCHOOL DURATION DAY(S)
Our records indicate that you are due for fasting labwork on or after 12/4/18. These orders have been placed and you do not need an appointment to have these drawn. Please have lab work completed at any [facility] lab.     Please do not eat any food or drink any beverages 12 hours prior to having this lab work completed. You may drink water prior to these tests. Please take all medications as usual. Do not drink any alcoholic beverages for 24 hours prior to testing. If you have any questions, please feel free to call this office at [phone].             2 weeks

## 2019-12-30 NOTE — PRE-ANESTHESIA EVALUATION ADULT - NSANTHPMHFT_GEN_ALL_CORE
45 y/o female.  PMHx of Anemia (s/p iron infusion 12/23/19), Asthma (no h/o of exacerbation >5 years ), Morbid Obesity (s/p Vertical gastrectomy 12/2017, BMI now 32), hypothyroid, GERD, c/o menorrhagia.   Diagnostic imaging revealed myoma, adenomyosis, and possible endometriosis.   For a scheduled total laparoscopic hysterectomy, bilateral salpingectomy, cystoscopy on 12/30/2019.

## 2019-12-30 NOTE — ASU DISCHARGE PLAN (ADULT/PEDIATRIC) - CARE PROVIDER_API CALL
Wale Howard)  Obstetrics and Gynecology  [address] [place] [address]  Phone: [phone]  Fax: [phone]  Follow Up Time:

## 2019-12-30 NOTE — ASU PATIENT PROFILE, ADULT - PMH
Abscess of right leg  history - resolved  Anemia  received Iron infusions, last 12/23/19  Asthma  last exacerbation ~5 years ago - not on medication  Cyst of ovary  s/p removal  GERD (gastroesophageal reflux disease)    Morbid (severe) obesity due to excess calories

## 2019-12-30 NOTE — BRIEF OPERATIVE NOTE - NSICDXBRIEFPROCEDURE_GEN_ALL_CORE_FT
PROCEDURES:  Lysis of adhesions, pelvic 30-Dec-2019 11:05:46  Jen Bethea  Bilateral salpingectomy 30-Dec-2019 11:05:31  Jen Bethea  Laparoscopic total hysterectomy with cystoscopy 30-Dec-2019 11:05:23  Jen Bethea

## 2019-12-30 NOTE — BRIEF OPERATIVE NOTE - NSICDXBRIEFPOSTOP_GEN_ALL_CORE_FT
POST-OP DIAGNOSIS:  Endometriosis 30-Dec-2019 11:07:01  Jen Bethea  Abnormal uterine bleeding 30-Dec-2019 11:06:22  Jen Bethea

## 2020-01-06 PROBLEM — J45.909 UNSPECIFIED ASTHMA, UNCOMPLICATED: Chronic | Status: ACTIVE | Noted: 2017-12-08

## 2020-01-06 PROBLEM — D64.9 ANEMIA, UNSPECIFIED: Chronic | Status: ACTIVE | Noted: 2017-12-08

## 2020-01-06 PROBLEM — K21.9 GASTRO-ESOPHAGEAL REFLUX DISEASE WITHOUT ESOPHAGITIS: Chronic | Status: ACTIVE | Noted: 2019-12-26

## 2020-01-14 ENCOUNTER — APPOINTMENT (OUTPATIENT)
Dept: OBGYN | Facility: CLINIC | Age: 45
End: 2020-01-14
Payer: COMMERCIAL

## 2020-01-14 VITALS
SYSTOLIC BLOOD PRESSURE: 117 MMHG | DIASTOLIC BLOOD PRESSURE: 70 MMHG | HEIGHT: 63 IN | WEIGHT: 180.25 LBS | BODY MASS INDEX: 31.94 KG/M2

## 2020-01-14 DIAGNOSIS — N92.1 EXCESSIVE AND FREQUENT MENSTRUATION WITH IRREGULAR CYCLE: ICD-10-CM

## 2020-01-14 DIAGNOSIS — Z01.818 ENCOUNTER FOR OTHER PREPROCEDURAL EXAMINATION: ICD-10-CM

## 2020-01-14 DIAGNOSIS — R39.89 OTHER SYMPTOMS AND SIGNS INVOLVING THE GENITOURINARY SYSTEM: ICD-10-CM

## 2020-01-14 DIAGNOSIS — N80.0 ENDOMETRIOSIS OF UTERUS: ICD-10-CM

## 2020-01-14 DIAGNOSIS — Z87.42 PERSONAL HISTORY OF OTHER DISEASES OF THE FEMALE GENITAL TRACT: ICD-10-CM

## 2020-01-14 DIAGNOSIS — Z86.018 PERSONAL HISTORY OF OTHER BENIGN NEOPLASM: ICD-10-CM

## 2020-01-14 PROCEDURE — 99024 POSTOP FOLLOW-UP VISIT: CPT

## 2020-01-16 NOTE — PHYSICAL EXAM
[Awake] : awake [Alert] : alert [Soft] : soft [None] : no CVA tenderness [Oriented x3] : oriented to person, place, and time [Acute Distress] : no acute distress [Tender] : non tender [Distended] : not distended [Depressed Mood] : not depressed [de-identified] : there is a healing tear in the

## 2020-02-03 ENCOUNTER — FORM ENCOUNTER (OUTPATIENT)
Age: 45
End: 2020-02-03

## 2020-02-04 ENCOUNTER — OUTPATIENT (OUTPATIENT)
Dept: OUTPATIENT SERVICES | Facility: HOSPITAL | Age: 45
LOS: 1 days | End: 2020-02-04
Payer: COMMERCIAL

## 2020-02-04 ENCOUNTER — APPOINTMENT (OUTPATIENT)
Dept: MAMMOGRAPHY | Facility: IMAGING CENTER | Age: 45
End: 2020-02-04
Payer: COMMERCIAL

## 2020-02-04 ENCOUNTER — APPOINTMENT (OUTPATIENT)
Dept: ULTRASOUND IMAGING | Facility: IMAGING CENTER | Age: 45
End: 2020-02-04
Payer: COMMERCIAL

## 2020-02-04 DIAGNOSIS — Z98.890 OTHER SPECIFIED POSTPROCEDURAL STATES: Chronic | ICD-10-CM

## 2020-02-04 DIAGNOSIS — R92.2 INCONCLUSIVE MAMMOGRAM: ICD-10-CM

## 2020-02-04 DIAGNOSIS — Z98.891 HISTORY OF UTERINE SCAR FROM PREVIOUS SURGERY: Chronic | ICD-10-CM

## 2020-02-04 DIAGNOSIS — Z90.49 ACQUIRED ABSENCE OF OTHER SPECIFIED PARTS OF DIGESTIVE TRACT: Chronic | ICD-10-CM

## 2020-02-04 DIAGNOSIS — Z98.84 BARIATRIC SURGERY STATUS: Chronic | ICD-10-CM

## 2020-02-04 DIAGNOSIS — Z98.51 TUBAL LIGATION STATUS: Chronic | ICD-10-CM

## 2020-02-04 DIAGNOSIS — Z00.00 ENCOUNTER FOR GENERAL ADULT MEDICAL EXAMINATION WITHOUT ABNORMAL FINDINGS: ICD-10-CM

## 2020-02-04 PROCEDURE — 77063 BREAST TOMOSYNTHESIS BI: CPT

## 2020-02-04 PROCEDURE — 77067 SCR MAMMO BI INCL CAD: CPT | Mod: 26

## 2020-02-04 PROCEDURE — 77067 SCR MAMMO BI INCL CAD: CPT

## 2020-02-04 PROCEDURE — 76641 ULTRASOUND BREAST COMPLETE: CPT | Mod: 26,50

## 2020-02-04 PROCEDURE — 77063 BREAST TOMOSYNTHESIS BI: CPT | Mod: 26

## 2020-02-04 PROCEDURE — 76641 ULTRASOUND BREAST COMPLETE: CPT

## 2020-02-18 ENCOUNTER — FORM ENCOUNTER (OUTPATIENT)
Age: 45
End: 2020-02-18

## 2020-02-19 ENCOUNTER — APPOINTMENT (OUTPATIENT)
Dept: ULTRASOUND IMAGING | Facility: IMAGING CENTER | Age: 45
End: 2020-02-19
Payer: COMMERCIAL

## 2020-02-19 ENCOUNTER — OUTPATIENT (OUTPATIENT)
Dept: OUTPATIENT SERVICES | Facility: HOSPITAL | Age: 45
LOS: 1 days | End: 2020-02-19
Payer: COMMERCIAL

## 2020-02-19 DIAGNOSIS — Z90.49 ACQUIRED ABSENCE OF OTHER SPECIFIED PARTS OF DIGESTIVE TRACT: Chronic | ICD-10-CM

## 2020-02-19 DIAGNOSIS — Z00.8 ENCOUNTER FOR OTHER GENERAL EXAMINATION: ICD-10-CM

## 2020-02-19 DIAGNOSIS — Z98.891 HISTORY OF UTERINE SCAR FROM PREVIOUS SURGERY: Chronic | ICD-10-CM

## 2020-02-19 DIAGNOSIS — Z98.890 OTHER SPECIFIED POSTPROCEDURAL STATES: Chronic | ICD-10-CM

## 2020-02-19 DIAGNOSIS — Z98.84 BARIATRIC SURGERY STATUS: Chronic | ICD-10-CM

## 2020-02-19 DIAGNOSIS — Z98.51 TUBAL LIGATION STATUS: Chronic | ICD-10-CM

## 2020-02-19 PROCEDURE — 76642 ULTRASOUND BREAST LIMITED: CPT

## 2020-02-19 PROCEDURE — 76642 ULTRASOUND BREAST LIMITED: CPT | Mod: 26,RT

## 2020-02-29 PROBLEM — Z98.890 POSTOPERATIVE STATE: Status: RESOLVED | Noted: 2020-01-14 | Resolved: 2020-02-29

## 2020-03-09 DIAGNOSIS — N63.10 UNSPECIFIED LUMP IN THE RIGHT BREAST, UNSPECIFIED QUADRANT: ICD-10-CM

## 2020-03-10 LAB — BACTERIA UR CULT: NORMAL

## 2020-08-03 ENCOUNTER — APPOINTMENT (OUTPATIENT)
Dept: ULTRASOUND IMAGING | Facility: IMAGING CENTER | Age: 45
End: 2020-08-03
Payer: COMMERCIAL

## 2020-08-03 ENCOUNTER — OUTPATIENT (OUTPATIENT)
Dept: OUTPATIENT SERVICES | Facility: HOSPITAL | Age: 45
LOS: 1 days | End: 2020-08-03
Payer: COMMERCIAL

## 2020-08-03 ENCOUNTER — RESULT REVIEW (OUTPATIENT)
Age: 45
End: 2020-08-03

## 2020-08-03 DIAGNOSIS — Z98.890 OTHER SPECIFIED POSTPROCEDURAL STATES: Chronic | ICD-10-CM

## 2020-08-03 DIAGNOSIS — R92.8 OTHER ABNORMAL AND INCONCLUSIVE FINDINGS ON DIAGNOSTIC IMAGING OF BREAST: ICD-10-CM

## 2020-08-03 DIAGNOSIS — Z98.51 TUBAL LIGATION STATUS: Chronic | ICD-10-CM

## 2020-08-03 DIAGNOSIS — N63.10 UNSPECIFIED LUMP IN THE RIGHT BREAST, UNSPECIFIED QUADRANT: ICD-10-CM

## 2020-08-03 DIAGNOSIS — Z98.84 BARIATRIC SURGERY STATUS: Chronic | ICD-10-CM

## 2020-08-03 DIAGNOSIS — Z90.49 ACQUIRED ABSENCE OF OTHER SPECIFIED PARTS OF DIGESTIVE TRACT: Chronic | ICD-10-CM

## 2020-08-03 DIAGNOSIS — Z98.891 HISTORY OF UTERINE SCAR FROM PREVIOUS SURGERY: Chronic | ICD-10-CM

## 2020-08-03 PROCEDURE — 76642 ULTRASOUND BREAST LIMITED: CPT

## 2020-08-03 PROCEDURE — 76642 ULTRASOUND BREAST LIMITED: CPT | Mod: 26,RT

## 2020-08-06 ENCOUNTER — RESULT REVIEW (OUTPATIENT)
Age: 45
End: 2020-08-06

## 2020-08-06 ENCOUNTER — OUTPATIENT (OUTPATIENT)
Dept: OUTPATIENT SERVICES | Facility: HOSPITAL | Age: 45
LOS: 1 days | End: 2020-08-06
Payer: COMMERCIAL

## 2020-08-06 ENCOUNTER — APPOINTMENT (OUTPATIENT)
Dept: ULTRASOUND IMAGING | Facility: IMAGING CENTER | Age: 45
End: 2020-08-06
Payer: COMMERCIAL

## 2020-08-06 DIAGNOSIS — Z98.891 HISTORY OF UTERINE SCAR FROM PREVIOUS SURGERY: Chronic | ICD-10-CM

## 2020-08-06 DIAGNOSIS — Z98.84 BARIATRIC SURGERY STATUS: Chronic | ICD-10-CM

## 2020-08-06 DIAGNOSIS — R92.8 OTHER ABNORMAL AND INCONCLUSIVE FINDINGS ON DIAGNOSTIC IMAGING OF BREAST: ICD-10-CM

## 2020-08-06 DIAGNOSIS — Z98.890 OTHER SPECIFIED POSTPROCEDURAL STATES: Chronic | ICD-10-CM

## 2020-08-06 DIAGNOSIS — Z90.49 ACQUIRED ABSENCE OF OTHER SPECIFIED PARTS OF DIGESTIVE TRACT: Chronic | ICD-10-CM

## 2020-08-06 DIAGNOSIS — Z98.51 TUBAL LIGATION STATUS: Chronic | ICD-10-CM

## 2020-08-06 PROCEDURE — 88305 TISSUE EXAM BY PATHOLOGIST: CPT

## 2020-08-06 PROCEDURE — 77065 DX MAMMO INCL CAD UNI: CPT

## 2020-08-06 PROCEDURE — 88305 TISSUE EXAM BY PATHOLOGIST: CPT | Mod: 26

## 2020-08-06 PROCEDURE — 19083 BX BREAST 1ST LESION US IMAG: CPT

## 2020-08-06 PROCEDURE — 19083 BX BREAST 1ST LESION US IMAG: CPT | Mod: RT

## 2020-08-06 PROCEDURE — 77065 DX MAMMO INCL CAD UNI: CPT | Mod: 26,RT

## 2020-08-07 LAB — SURGICAL PATHOLOGY STUDY: SIGNIFICANT CHANGE UP

## 2020-10-20 NOTE — ASU PREOP CHECKLIST - BP NONINVASIVE SYSTOLIC (MM HG)
Outreach to patient for hypertension management.     RE: Need to find out if patient is monitoring blood pressure at home.  If so, the most recent blood pressure is needed to update the chart.    Message sent to patient's portal.    Most recent blood pressure reading was 146/78     103

## 2020-11-16 NOTE — H&P PST ADULT - NSANTHAGERD_ENT_A_CORE
No Rifampin Counseling: I discussed with the patient the risks of rifampin including but not limited to liver damage, kidney damage, red-orange body fluids, nausea/vomiting and severe allergy.

## 2020-12-21 ENCOUNTER — APPOINTMENT (OUTPATIENT)
Dept: INTERNAL MEDICINE | Facility: CLINIC | Age: 45
End: 2020-12-21
Payer: COMMERCIAL

## 2020-12-21 VITALS
WEIGHT: 183 LBS | DIASTOLIC BLOOD PRESSURE: 80 MMHG | OXYGEN SATURATION: 99 % | TEMPERATURE: 98 F | BODY MASS INDEX: 32.42 KG/M2 | HEART RATE: 64 BPM | SYSTOLIC BLOOD PRESSURE: 120 MMHG

## 2020-12-21 DIAGNOSIS — M19.90 UNSPECIFIED OSTEOARTHRITIS, UNSPECIFIED SITE: ICD-10-CM

## 2020-12-21 DIAGNOSIS — Z23 ENCOUNTER FOR IMMUNIZATION: ICD-10-CM

## 2020-12-21 DIAGNOSIS — K21.9 GASTRO-ESOPHAGEAL REFLUX DISEASE W/OUT ESOPHAGITIS: ICD-10-CM

## 2020-12-21 DIAGNOSIS — J45.909 UNSPECIFIED ASTHMA, UNCOMPLICATED: ICD-10-CM

## 2020-12-21 DIAGNOSIS — M54.31 SCIATICA, RIGHT SIDE: ICD-10-CM

## 2020-12-21 PROCEDURE — 99214 OFFICE O/P EST MOD 30 MIN: CPT | Mod: 25

## 2020-12-21 PROCEDURE — 36415 COLL VENOUS BLD VENIPUNCTURE: CPT

## 2020-12-21 PROCEDURE — 99072 ADDL SUPL MATRL&STAF TM PHE: CPT

## 2020-12-21 RX ORDER — METHYLPREDNISOLONE 4 MG/1
4 TABLET ORAL
Qty: 21 | Refills: 0 | Status: ACTIVE | COMMUNITY
Start: 2020-12-21 | End: 1900-01-01

## 2020-12-21 RX ORDER — ALBUTEROL SULFATE 90 UG/1
108 (90 BASE) INHALANT RESPIRATORY (INHALATION)
Qty: 1 | Refills: 5 | Status: ACTIVE | COMMUNITY
Start: 2020-12-21 | End: 1900-01-01

## 2020-12-21 NOTE — HISTORY OF PRESENT ILLNESS
[FreeTextEntry1] : follow up [de-identified] : sciatica right greater than left\par chest tightness from asthma  does not have proventik\par no recent blood\par history of anemia treated with hysterectomy\par Moving to [place] feb\par arthitis issues also\par probable covid march\par

## 2020-12-22 LAB
25(OH)D3 SERPL-MCNC: 30.2 NG/ML
ALBUMIN SERPL ELPH-MCNC: 4.7 G/DL
ALP BLD-CCNC: 90 U/L
ALT SERPL-CCNC: 9 U/L
ANION GAP SERPL CALC-SCNC: 13 MMOL/L
AST SERPL-CCNC: 9 U/L
BASOPHILS # BLD AUTO: 0.05 K/UL
BASOPHILS NFR BLD AUTO: 0.9 %
BILIRUB SERPL-MCNC: 0.3 MG/DL
BUN SERPL-MCNC: 11 MG/DL
CALCIUM SERPL-MCNC: 9.7 MG/DL
CHLORIDE SERPL-SCNC: 104 MMOL/L
CHOLEST SERPL-MCNC: 222 MG/DL
CO2 SERPL-SCNC: 25 MMOL/L
CREAT SERPL-MCNC: 0.79 MG/DL
EOSINOPHIL # BLD AUTO: 0.02 K/UL
EOSINOPHIL NFR BLD AUTO: 0.4 %
ESTIMATED AVERAGE GLUCOSE: 105 MG/DL
GLUCOSE SERPL-MCNC: 92 MG/DL
HBA1C MFR BLD HPLC: 5.3 %
HCT VFR BLD CALC: 42.6 %
HDLC SERPL-MCNC: 49 MG/DL
HGB BLD-MCNC: 13.9 G/DL
IMM GRANULOCYTES NFR BLD AUTO: 0.4 %
LDLC SERPL CALC-MCNC: 140 MG/DL
LYMPHOCYTES # BLD AUTO: 1.7 K/UL
LYMPHOCYTES NFR BLD AUTO: 30.9 %
MAN DIFF?: NORMAL
MCHC RBC-ENTMCNC: 30.3 PG
MCHC RBC-ENTMCNC: 32.6 GM/DL
MCV RBC AUTO: 92.8 FL
MONOCYTES # BLD AUTO: 0.73 K/UL
MONOCYTES NFR BLD AUTO: 13.3 %
NEUTROPHILS # BLD AUTO: 2.98 K/UL
NEUTROPHILS NFR BLD AUTO: 54.1 %
NONHDLC SERPL-MCNC: 173 MG/DL
PLATELET # BLD AUTO: 251 K/UL
POTASSIUM SERPL-SCNC: 4.1 MMOL/L
PROT SERPL-MCNC: 7.1 G/DL
RBC # BLD: 4.59 M/UL
RBC # FLD: 12.5 %
SODIUM SERPL-SCNC: 142 MMOL/L
T4 FREE SERPL-MCNC: 1.2 NG/DL
TRIGL SERPL-MCNC: 164 MG/DL
TSH SERPL-ACNC: 2.81 UIU/ML
WBC # FLD AUTO: 5.5 K/UL

## 2021-02-12 ENCOUNTER — APPOINTMENT (OUTPATIENT)
Age: 46
End: 2021-02-12
Payer: COMMERCIAL

## 2021-02-12 VITALS
DIASTOLIC BLOOD PRESSURE: 76 MMHG | SYSTOLIC BLOOD PRESSURE: 107 MMHG | BODY MASS INDEX: 32.96 KG/M2 | WEIGHT: 186 LBS | HEART RATE: 70 BPM | TEMPERATURE: 97.7 F | HEIGHT: 63 IN

## 2021-02-12 DIAGNOSIS — R10.2 PELVIC AND PERINEAL PAIN: ICD-10-CM

## 2021-02-12 PROCEDURE — 99072 ADDL SUPL MATRL&STAF TM PHE: CPT

## 2021-02-12 PROCEDURE — 99213 OFFICE O/P EST LOW 20 MIN: CPT

## 2021-02-16 ENCOUNTER — APPOINTMENT (OUTPATIENT)
Dept: OBGYN | Facility: CLINIC | Age: 46
End: 2021-02-16
Payer: COMMERCIAL

## 2021-02-16 ENCOUNTER — ASOB RESULT (OUTPATIENT)
Age: 46
End: 2021-02-16

## 2021-02-16 PROCEDURE — 76830 TRANSVAGINAL US NON-OB: CPT | Mod: 59

## 2021-02-16 PROCEDURE — 99072 ADDL SUPL MATRL&STAF TM PHE: CPT

## 2021-02-16 PROCEDURE — 76857 US EXAM PELVIC LIMITED: CPT

## 2021-03-02 ENCOUNTER — RESULT REVIEW (OUTPATIENT)
Age: 46
End: 2021-03-02

## 2021-03-02 ENCOUNTER — APPOINTMENT (OUTPATIENT)
Dept: OBGYN | Facility: CLINIC | Age: 46
End: 2021-03-02
Payer: COMMERCIAL

## 2021-03-02 VITALS
HEART RATE: 75 BPM | SYSTOLIC BLOOD PRESSURE: 111 MMHG | TEMPERATURE: 97.5 F | HEIGHT: 63 IN | WEIGHT: 183 LBS | DIASTOLIC BLOOD PRESSURE: 73 MMHG | BODY MASS INDEX: 32.43 KG/M2

## 2021-03-02 DIAGNOSIS — Z01.419 ENCOUNTER FOR GYNECOLOGICAL EXAMINATION (GENERAL) (ROUTINE) W/OUT ABNORMAL FINDINGS: ICD-10-CM

## 2021-03-02 PROCEDURE — 99072 ADDL SUPL MATRL&STAF TM PHE: CPT

## 2021-03-02 PROCEDURE — 99396 PREV VISIT EST AGE 40-64: CPT

## 2021-03-04 ENCOUNTER — RESULT REVIEW (OUTPATIENT)
Age: 46
End: 2021-03-04

## 2021-03-04 ENCOUNTER — OUTPATIENT (OUTPATIENT)
Dept: OUTPATIENT SERVICES | Facility: HOSPITAL | Age: 46
LOS: 1 days | End: 2021-03-04
Payer: COMMERCIAL

## 2021-03-04 ENCOUNTER — APPOINTMENT (OUTPATIENT)
Dept: ULTRASOUND IMAGING | Facility: IMAGING CENTER | Age: 46
End: 2021-03-04
Payer: COMMERCIAL

## 2021-03-04 ENCOUNTER — APPOINTMENT (OUTPATIENT)
Dept: MAMMOGRAPHY | Facility: IMAGING CENTER | Age: 46
End: 2021-03-04
Payer: COMMERCIAL

## 2021-03-04 DIAGNOSIS — Z00.8 ENCOUNTER FOR OTHER GENERAL EXAMINATION: ICD-10-CM

## 2021-03-04 DIAGNOSIS — Z98.891 HISTORY OF UTERINE SCAR FROM PREVIOUS SURGERY: Chronic | ICD-10-CM

## 2021-03-04 DIAGNOSIS — Z98.84 BARIATRIC SURGERY STATUS: Chronic | ICD-10-CM

## 2021-03-04 DIAGNOSIS — Z98.51 TUBAL LIGATION STATUS: Chronic | ICD-10-CM

## 2021-03-04 DIAGNOSIS — Z90.49 ACQUIRED ABSENCE OF OTHER SPECIFIED PARTS OF DIGESTIVE TRACT: Chronic | ICD-10-CM

## 2021-03-04 DIAGNOSIS — Z98.890 OTHER SPECIFIED POSTPROCEDURAL STATES: Chronic | ICD-10-CM

## 2021-03-04 PROCEDURE — 77067 SCR MAMMO BI INCL CAD: CPT

## 2021-03-04 PROCEDURE — 76641 ULTRASOUND BREAST COMPLETE: CPT | Mod: 26,50

## 2021-03-04 PROCEDURE — 77063 BREAST TOMOSYNTHESIS BI: CPT

## 2021-03-04 PROCEDURE — 77067 SCR MAMMO BI INCL CAD: CPT | Mod: 26

## 2021-03-04 PROCEDURE — 76641 ULTRASOUND BREAST COMPLETE: CPT

## 2021-03-04 PROCEDURE — 77063 BREAST TOMOSYNTHESIS BI: CPT | Mod: 26

## 2021-04-27 ENCOUNTER — APPOINTMENT (OUTPATIENT)
Dept: OBGYN | Facility: CLINIC | Age: 46
End: 2021-04-27

## 2021-06-30 ENCOUNTER — APPOINTMENT (OUTPATIENT)
Dept: MRI IMAGING | Facility: IMAGING CENTER | Age: 46
End: 2021-06-30
Payer: COMMERCIAL

## 2021-06-30 ENCOUNTER — OUTPATIENT (OUTPATIENT)
Dept: OUTPATIENT SERVICES | Facility: HOSPITAL | Age: 46
LOS: 1 days | End: 2021-06-30
Payer: COMMERCIAL

## 2021-06-30 DIAGNOSIS — Z98.891 HISTORY OF UTERINE SCAR FROM PREVIOUS SURGERY: Chronic | ICD-10-CM

## 2021-06-30 DIAGNOSIS — Z90.49 ACQUIRED ABSENCE OF OTHER SPECIFIED PARTS OF DIGESTIVE TRACT: Chronic | ICD-10-CM

## 2021-06-30 DIAGNOSIS — R92.8 OTHER ABNORMAL AND INCONCLUSIVE FINDINGS ON DIAGNOSTIC IMAGING OF BREAST: ICD-10-CM

## 2021-06-30 DIAGNOSIS — Z00.8 ENCOUNTER FOR OTHER GENERAL EXAMINATION: ICD-10-CM

## 2021-06-30 DIAGNOSIS — Z98.84 BARIATRIC SURGERY STATUS: Chronic | ICD-10-CM

## 2021-06-30 DIAGNOSIS — Z98.51 TUBAL LIGATION STATUS: Chronic | ICD-10-CM

## 2021-06-30 DIAGNOSIS — Z98.890 OTHER SPECIFIED POSTPROCEDURAL STATES: Chronic | ICD-10-CM

## 2021-06-30 PROCEDURE — C8937: CPT

## 2021-06-30 PROCEDURE — A9585: CPT

## 2021-06-30 PROCEDURE — 77049 MRI BREAST C-+ W/CAD BI: CPT | Mod: 26

## 2021-06-30 PROCEDURE — C8908: CPT

## 2021-06-30 RX ORDER — RABEPRAZOLE SODIUM 20 MG/1
20 TABLET, DELAYED RELEASE ORAL
Qty: 90 | Refills: 3 | Status: ACTIVE | COMMUNITY
Start: 2018-07-05 | End: 1900-01-01

## 2021-06-30 RX ORDER — LEVOTHYROXINE SODIUM 0.05 MG/1
50 TABLET ORAL
Qty: 90 | Refills: 3 | Status: ACTIVE | COMMUNITY
Start: 2019-02-06 | End: 1900-01-01

## 2021-07-01 ENCOUNTER — NON-APPOINTMENT (OUTPATIENT)
Age: 46
End: 2021-07-01

## 2021-07-01 ENCOUNTER — RESULT REVIEW (OUTPATIENT)
Age: 46
End: 2021-07-01

## 2021-07-03 NOTE — ASU PREOP CHECKLIST - PATIENT PROBLEMS/NEEDS
Admission Reconciliation is Completed  Discharge Reconciliation is Not Complete Patient expressed no known problems or needs Admission Reconciliation is Completed  Discharge Reconciliation is Completed

## 2021-07-05 ENCOUNTER — OUTPATIENT (OUTPATIENT)
Dept: OUTPATIENT SERVICES | Facility: HOSPITAL | Age: 46
LOS: 1 days | End: 2021-07-05
Payer: COMMERCIAL

## 2021-07-05 ENCOUNTER — RESULT REVIEW (OUTPATIENT)
Age: 46
End: 2021-07-05

## 2021-07-05 ENCOUNTER — APPOINTMENT (OUTPATIENT)
Dept: ULTRASOUND IMAGING | Facility: CLINIC | Age: 46
End: 2021-07-05
Payer: COMMERCIAL

## 2021-07-05 DIAGNOSIS — Z98.84 BARIATRIC SURGERY STATUS: Chronic | ICD-10-CM

## 2021-07-05 DIAGNOSIS — Z98.891 HISTORY OF UTERINE SCAR FROM PREVIOUS SURGERY: Chronic | ICD-10-CM

## 2021-07-05 DIAGNOSIS — Z98.890 OTHER SPECIFIED POSTPROCEDURAL STATES: Chronic | ICD-10-CM

## 2021-07-05 DIAGNOSIS — Z98.51 TUBAL LIGATION STATUS: Chronic | ICD-10-CM

## 2021-07-05 DIAGNOSIS — Z90.49 ACQUIRED ABSENCE OF OTHER SPECIFIED PARTS OF DIGESTIVE TRACT: Chronic | ICD-10-CM

## 2021-07-05 DIAGNOSIS — Z00.8 ENCOUNTER FOR OTHER GENERAL EXAMINATION: ICD-10-CM

## 2021-07-05 PROCEDURE — 76642 ULTRASOUND BREAST LIMITED: CPT | Mod: 26,LT

## 2021-07-05 PROCEDURE — 76642 ULTRASOUND BREAST LIMITED: CPT

## 2022-12-12 NOTE — H&P PST ADULT - LYMPHATIC
Regarding: wi f 32 temporary vision loss - about 45minutes - only in one area - second time it has happened wit  ----- Message from Vick Faulkner sent at 12/12/2022  2:01 PM CST -----  Patient Name: Lori Patel    Specialist or PCP Name: abelino sanchez    Symptoms: temporary vision loss - about 45minutes - only in one area - second time it has happened within the last week    Pregnant (females aged 13-60. If Yes, how long?) : n/a    Call Back # : [phone]    Which State are you currently located in?: [place]    Name of Clinic Site / Acct# : [facility]    Use following scripting for patients waiting for a callback:   “Nurse callback times vary based on call volumes; please be aware the return phone call may come from an unidentified phone number. If your symptoms worsen or become life threatening while waiting, you should seek immediate assistance by calling 911 or going to the ER for evaluation.”   anterior cervical R/supraclavicular R/supraclavicular L/posterior cervical L/anterior cervical L/posterior cervical R

## 2023-03-21 NOTE — ASU DISCHARGE PLAN (ADULT/PEDIATRIC) - PAIN MANAGEMENT
Stable Take over the counter pain medication/Prescriptions electronically submitted to pharmacy from [facility]

## 2025-02-08 NOTE — H&P PST ADULT - EYES
You were seen here today for evaluation of a hand injury.  Your x-rays were negative with no fractures or dislocations.    Your wounds were closed using skin glue, this should fall off on its own within a few days.  You may wash your hands with soap and water and shower as normal but try not to submerge the hand underwater.  Try not to pick at the glue and protect your fingers to prevent the wounds from reopening.     You may take Tylenol and ibuprofen for pain/fever, do not exceed 4000 mg of Tylenol per day or 3200 mg ofibuprofen per day.    Follow up with primary care as needed. Return here for any new or worsening symptoms including severe pain, signs of infection like increased redness/warmth/pus discharge, fever, or any other symptoms that concern you.    detailed exam EOMI/PERRL/conjunctiva clear

## 2025-04-15 NOTE — H&P PST ADULT - BACK EXAM
Order for Magnetic Resonance Imaging (MRI) faxed to [facility]) at [phone]-confirmation received   MYCHART to patient    strength intact/normal/paraspinal spasm L/normal shape/ROM intact